# Patient Record
Sex: MALE | Race: ASIAN | NOT HISPANIC OR LATINO | URBAN - METROPOLITAN AREA
[De-identification: names, ages, dates, MRNs, and addresses within clinical notes are randomized per-mention and may not be internally consistent; named-entity substitution may affect disease eponyms.]

---

## 2023-05-10 ENCOUNTER — INPATIENT (INPATIENT)
Facility: HOSPITAL | Age: 88
LOS: 0 days | Discharge: HOME | DRG: 948 | End: 2023-05-11
Attending: HOSPITALIST | Admitting: HOSPITALIST
Payer: MEDICARE

## 2023-05-10 VITALS
WEIGHT: 145.06 LBS | OXYGEN SATURATION: 96 % | RESPIRATION RATE: 18 BRPM | HEART RATE: 75 BPM | DIASTOLIC BLOOD PRESSURE: 68 MMHG | TEMPERATURE: 98 F | SYSTOLIC BLOOD PRESSURE: 139 MMHG | HEIGHT: 67 IN

## 2023-05-10 DIAGNOSIS — R53.1 WEAKNESS: ICD-10-CM

## 2023-05-10 LAB
ALBUMIN SERPL ELPH-MCNC: 4 G/DL — SIGNIFICANT CHANGE UP (ref 3.5–5.2)
ALP SERPL-CCNC: 63 U/L — SIGNIFICANT CHANGE UP (ref 30–115)
ALT FLD-CCNC: 9 U/L — SIGNIFICANT CHANGE UP (ref 0–41)
ANION GAP SERPL CALC-SCNC: 12 MMOL/L — SIGNIFICANT CHANGE UP (ref 7–14)
AST SERPL-CCNC: 16 U/L — SIGNIFICANT CHANGE UP (ref 0–41)
BASOPHILS # BLD AUTO: 0.03 K/UL — SIGNIFICANT CHANGE UP (ref 0–0.2)
BASOPHILS NFR BLD AUTO: 0.3 % — SIGNIFICANT CHANGE UP (ref 0–1)
BILIRUB SERPL-MCNC: 0.4 MG/DL — SIGNIFICANT CHANGE UP (ref 0.2–1.2)
BUN SERPL-MCNC: 31 MG/DL — HIGH (ref 10–20)
CALCIUM SERPL-MCNC: 9.3 MG/DL — SIGNIFICANT CHANGE UP (ref 8.4–10.5)
CHLORIDE SERPL-SCNC: 104 MMOL/L — SIGNIFICANT CHANGE UP (ref 98–110)
CO2 SERPL-SCNC: 23 MMOL/L — SIGNIFICANT CHANGE UP (ref 17–32)
CREAT SERPL-MCNC: 1.7 MG/DL — HIGH (ref 0.7–1.5)
EGFR: 39 ML/MIN/1.73M2 — LOW
EOSINOPHIL # BLD AUTO: 0.03 K/UL — SIGNIFICANT CHANGE UP (ref 0–0.7)
EOSINOPHIL NFR BLD AUTO: 0.3 % — SIGNIFICANT CHANGE UP (ref 0–8)
GLUCOSE SERPL-MCNC: 114 MG/DL — HIGH (ref 70–99)
HCT VFR BLD CALC: 42.7 % — SIGNIFICANT CHANGE UP (ref 42–52)
HGB BLD-MCNC: 14.2 G/DL — SIGNIFICANT CHANGE UP (ref 14–18)
IMM GRANULOCYTES NFR BLD AUTO: 0.4 % — HIGH (ref 0.1–0.3)
LYMPHOCYTES # BLD AUTO: 1.26 K/UL — SIGNIFICANT CHANGE UP (ref 1.2–3.4)
LYMPHOCYTES # BLD AUTO: 13.1 % — LOW (ref 20.5–51.1)
MAGNESIUM SERPL-MCNC: 2.4 MG/DL — SIGNIFICANT CHANGE UP (ref 1.8–2.4)
MCHC RBC-ENTMCNC: 31.5 PG — HIGH (ref 27–31)
MCHC RBC-ENTMCNC: 33.3 G/DL — SIGNIFICANT CHANGE UP (ref 32–37)
MCV RBC AUTO: 94.7 FL — HIGH (ref 80–94)
MONOCYTES # BLD AUTO: 0.48 K/UL — SIGNIFICANT CHANGE UP (ref 0.1–0.6)
MONOCYTES NFR BLD AUTO: 5 % — SIGNIFICANT CHANGE UP (ref 1.7–9.3)
NEUTROPHILS # BLD AUTO: 7.8 K/UL — HIGH (ref 1.4–6.5)
NEUTROPHILS NFR BLD AUTO: 80.9 % — HIGH (ref 42.2–75.2)
NRBC # BLD: 0 /100 WBCS — SIGNIFICANT CHANGE UP (ref 0–0)
NT-PROBNP SERPL-SCNC: 998 PG/ML — HIGH (ref 0–300)
PLATELET # BLD AUTO: 133 K/UL — SIGNIFICANT CHANGE UP (ref 130–400)
PMV BLD: 11.7 FL — HIGH (ref 7.4–10.4)
POTASSIUM SERPL-MCNC: 4.2 MMOL/L — SIGNIFICANT CHANGE UP (ref 3.5–5)
POTASSIUM SERPL-SCNC: 4.2 MMOL/L — SIGNIFICANT CHANGE UP (ref 3.5–5)
PROT SERPL-MCNC: 6.9 G/DL — SIGNIFICANT CHANGE UP (ref 6–8)
RBC # BLD: 4.51 M/UL — LOW (ref 4.7–6.1)
RBC # FLD: 12.4 % — SIGNIFICANT CHANGE UP (ref 11.5–14.5)
SODIUM SERPL-SCNC: 139 MMOL/L — SIGNIFICANT CHANGE UP (ref 135–146)
TROPONIN T SERPL-MCNC: <0.01 NG/ML — SIGNIFICANT CHANGE UP
WBC # BLD: 9.64 K/UL — SIGNIFICANT CHANGE UP (ref 4.8–10.8)
WBC # FLD AUTO: 9.64 K/UL — SIGNIFICANT CHANGE UP (ref 4.8–10.8)

## 2023-05-10 PROCEDURE — 82164 ANGIOTENSIN I ENZYME TEST: CPT

## 2023-05-10 PROCEDURE — 86901 BLOOD TYPING SEROLOGIC RH(D): CPT

## 2023-05-10 PROCEDURE — 86850 RBC ANTIBODY SCREEN: CPT

## 2023-05-10 PROCEDURE — 76775 US EXAM ABDO BACK WALL LIM: CPT

## 2023-05-10 PROCEDURE — 70450 CT HEAD/BRAIN W/O DYE: CPT | Mod: 26,MA

## 2023-05-10 PROCEDURE — 36415 COLL VENOUS BLD VENIPUNCTURE: CPT

## 2023-05-10 PROCEDURE — 93005 ELECTROCARDIOGRAM TRACING: CPT

## 2023-05-10 PROCEDURE — 99285 EMERGENCY DEPT VISIT HI MDM: CPT

## 2023-05-10 PROCEDURE — 86900 BLOOD TYPING SEROLOGIC ABO: CPT

## 2023-05-10 PROCEDURE — 80048 BASIC METABOLIC PNL TOTAL CA: CPT

## 2023-05-10 PROCEDURE — 99223 1ST HOSP IP/OBS HIGH 75: CPT

## 2023-05-10 PROCEDURE — 85027 COMPLETE CBC AUTOMATED: CPT

## 2023-05-10 PROCEDURE — 83735 ASSAY OF MAGNESIUM: CPT

## 2023-05-10 PROCEDURE — 87086 URINE CULTURE/COLONY COUNT: CPT

## 2023-05-10 PROCEDURE — 84100 ASSAY OF PHOSPHORUS: CPT

## 2023-05-10 PROCEDURE — 86480 TB TEST CELL IMMUN MEASURE: CPT

## 2023-05-10 PROCEDURE — 80061 LIPID PANEL: CPT

## 2023-05-10 PROCEDURE — 71045 X-RAY EXAM CHEST 1 VIEW: CPT | Mod: 26

## 2023-05-10 PROCEDURE — 97162 PT EVAL MOD COMPLEX 30 MIN: CPT | Mod: GP

## 2023-05-10 PROCEDURE — 81003 URINALYSIS AUTO W/O SCOPE: CPT

## 2023-05-10 NOTE — H&P ADULT - CONVERSATION DETAILS
Used language line  ID [id number]    Explained to pt that unless otherwise stated, in a hospital everyone is treated as full code, meaning we do everything to keep them alive such as compressions when a pt's heart does not beat on its own and intubation and ventilation when a pt cannot breathe on their own. Asked whether this is something that pt would want.   Stated "of course!"    Asked whether pt has anyone who they would want to make their healthcare decisions for them if they were too confused, not awake, or otherwise unable to do so.  HCP/Daughter Ivy [phone], [phone]

## 2023-05-10 NOTE — ED PROVIDER NOTE - CONSIDERATION OF ADMISSION OBSERVATION
Consideration of Admission/Observation Patient requires inpatient hospitalization - monitored setting.

## 2023-05-10 NOTE — ED PROVIDER NOTE - WR INTERPRETATION 1
No focal consolidation, possible ectatic versus dilated aorta, clinically does not correlate with any signs of dissection

## 2023-05-10 NOTE — ED ADULT NURSE NOTE - OBJECTIVE STATEMENT
Pt presents to the ED c/o a syncopal episode today. Pt is Cantonese speaking. Daughter states that patient was found to be weak and hypotensive. Pt is not currently hypotensive at bedside.

## 2023-05-10 NOTE — H&P ADULT - ASSESSMENT
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   Of note- pt is unreliable vs forgetful historian. I asked him in his native language twice whether he had a cardiologist and whether he had medications with him and he said no, same when using a Cantonese , but much later revealed he is seeing a cardiologist and took medications out of his pocket which included Losartan 50 and Eliquis 2.5mg.   Pt states he has some subacute-chronic weakness and maybe some SOB which is why he no longer leaves the house much as he cannot walk for too long. However, does not use any cane or walker. Has been feeling baseline until around 11am while preparing vegetables and started feeling "off" and lightheaded, so sat down and had not walked since due to fear he may fall. Denies any associated symptoms such as CP, SOB, n/v, abd pain, focal weakness numbness or tingling, speech or vision changes. Some time after arrival at hospital the dizziness self-resolved, lasting for a total of ~2 hours. States he had one episode in the past like this years ago for which he went to a hospital but cannot recall what happened there.   When asked, states he does not have the phone numbers of any of his providers (they have very common last names eg Dr Heredia), cannot remember the address of his pharmacy, does not have med list with him. Initially stated he cannot remember phone number of his daughter and had to specifically instruct him to get number from cell phone.    HCP/Daughter Ivy [phone], [phone]    #Dizziness  #Afib/flutter - unclear onset   On 5/10/23 had 2 hour episode of dizziness w no other symptoms and which self resolved.   CT Head done 5/10/23 in ED unremarkable. Neuro evaluated pt w rec for potential OP EEG.   - EKG done in ED w AF/flutter. At bedside monitor ED3 room 5 - seemed to be sinus, but after moving around and using urinal went into aflutter 110-120s.   - Cont Eliquis 2.5mg BID which pt revealed he was taking when I went to explain that he has an irregular heartbeat, but states he has not had irregular heartbeat before.   - Start metoprolol tartrate 25mg BID as pt intermittently tachycardic on minimal exertion   - EP consulted for new AF but please confer w pt's daughter/family re PMHx and Cardiologist contact info, pt may have had h/o AFib/flutter without realizing   - Echo to evaluate for HF, valve disease given murmur on exam   - If pt is paroxysmal AF should consider rhythm control   - PT consult    #Suspected CAD   #Chronic generalized weakness  Describes having full diet but seems to have limited exercise tolerance by description. Suspect underlying cardiac disease - pt initially denied having a cardiologist and then stated he does have one. CXR also w what appears to be calcified aorta.   Less likely nutritional deficiency as pt of reasonable build and no dietary restrictions, no anemia noted on labs though does have decreased MCV. Could be hypothyroidism but no constipation or weight gain.   - F/u Folate, B12, TSH   - Echo      #CKD   Unclear baseline Cr. Please clarify w family if possible.   Trend.    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 However, when informed of questionable lung mass noted on imaging, states that has been there for a long time and gets CT Chest w his Pulmonologist yearly, most recently a few months ago. Denies any medications for lungs.     #??R mastoid skin graft??  Unclear what surgery this pt is referring to. States tissue was taken from his leg and placed behind his R ear. Area looks depressed, but area well healed and w/o erythema, is clean, dry, intact, no evidence of infection. Potentially had h/o injury/infection/??OM and required skin graft?     #HTN - Losartan 50mg QD   #HLD - States he takes a medication for cholesterol - please clarify, but will start Atorvastatin 40mg qhs for now.   #hemorrhoidectomy - f/u as OP                                                                               ----------------------------------------------------  # DVT prophylaxis: on Eliquis 2.5mg BID as outpatient, unclear why but continue here for AF  # GI prophylaxis: N/A  # Diet: Regular, DASH TLC   # Activity: Ambulate with assistance   # Code status: FULL CODE   # Disposition: From home                                                                           --------------------------------------------------------    # Handoff:   - Med rec/PMH clarification w daughter   - Echo  - EP consult  - PT consult   - f/u TSH, B9, B12   Cantonese speaking 87 y/o man w PMHx of HTN, HLD, ?CAD, former tob use, 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   Of note- pt is unreliable vs forgetful historian. I asked him in his native language twice whether he had a cardiologist and whether he had medications with him and he said no, same when using a Cantonese , but much later revealed he is seeing a cardiologist and took medications out of his pocket which included Losartan 50 and Eliquis 2.5mg.   Pt states he has some subacute-chronic weakness and maybe some SOB which is why he no longer leaves the house much as he cannot walk for too long. However, does not use any cane or walker. Has been feeling baseline until around 11am while preparing vegetables and started feeling "off" and lightheaded, so sat down and had not walked since due to fear he may fall. Denies any associated symptoms such as CP, SOB, n/v, abd pain, focal weakness numbness or tingling, speech or vision changes. Some time after arrival at hospital the dizziness self-resolved, lasting for a total of ~2 hours. States he had one episode in the past like this years ago for which he went to a hospital but cannot recall what happened there.   When asked, states he does not have the phone numbers of any of his providers (they have very common last names eg Dr Heredia), cannot remember the address of his pharmacy, does not have med list with him. Initially stated he cannot remember phone number of his daughter and had to specifically instruct him to get number from cell phone.    HCP/Daughter Ivy [phone], [phone]    #Dizziness  #Afib/flutter - unclear onset   On 5/10/23 had 2 hour episode of dizziness w no other symptoms and which self resolved.   CT Head done 5/10/23 in ED unremarkable. Neuro evaluated pt w rec for potential OP EEG.   - EKG done in ED w AF/flutter. At bedside monitor ED3 room 5 - seemed to be sinus, but after moving around and using urinal went into aflutter 110-120s.   - Cont Eliquis 2.5mg BID which pt revealed he was taking when I went to explain that he has an irregular heartbeat, but states he has not had irregular heartbeat before.   - Start metoprolol tartrate 25mg BID as pt intermittently tachycardic on minimal exertion   - EP consulted for new AF but please confer w pt's daughter/family re PMHx and Cardiologist contact info, pt may have had h/o AFib/flutter without realizing   - Echo to evaluate for HF, valve disease given murmur on exam   - If pt is paroxysmal AF should consider rhythm control   - Orthostatics  - PT consult    #Suspected CAD   #Chronic generalized weakness  Describes having full diet but seems to have limited exercise tolerance by description. Suspect underlying cardiac disease - pt initially denied having a cardiologist and then stated he does have one. CXR also w what appears to be calcified aorta.   Less likely nutritional deficiency as pt of reasonable build and no dietary restrictions, no anemia noted on labs though does have decreased MCV. Could be hypothyroidism but no constipation or weight gain.   - F/u Folate, B12, TSH   - Echo      #CKD   Unclear baseline Cr. Please clarify w family if possible.   Trend.    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 However, when informed of questionable lung mass noted on imaging, states that has been there for a long time and gets CT Chest w his Pulmonologist yearly, most recently a few months ago. Denies any medications for lungs.     #??R mastoid skin graft??  Unclear what surgery this pt is referring to. States tissue was taken from his leg and placed behind his R ear. Area looks depressed, but area well healed and w/o erythema, is clean, dry, intact, no evidence of infection. Potentially had h/o injury/infection/??OM and required skin graft?     #HTN - Losartan 50mg QD   #HLD - States he takes a medication for cholesterol - please clarify, but will start Atorvastatin 40mg qhs for now.   #hemorrhoidectomy - f/u as OP                                                                               ----------------------------------------------------  # DVT prophylaxis: on Eliquis 2.5mg BID as outpatient, unclear why but continue here for AF  # GI prophylaxis: N/A  # Diet: Regular, DASH TLC   # Activity: Ambulate with assistance   # Code status: FULL CODE   # Disposition: From home                                                                           --------------------------------------------------------    # Handoff:   - Med rec/PMH clarification w daughter   - Echo  - EP consult  - PT consult   - f/u TSH, B9, B12

## 2023-05-10 NOTE — H&P ADULT - ATTENDING COMMENTS
Patient seen and examined at bedside independently of the residents. I read the resident's note and agree with the plan with the additions and corrections as noted below. My note supersedes the resident's note.     REVIEW OF SYSTEMS:  CONSTITUTIONAL: No weakness, fevers or chills  EYES/ENT: No visual changes;  No vertigo or throat pain   NECK: No pain or stiffness  RESPIRATORY: No cough, wheezing, hemoptysis; No shortness of breath  CARDIOVASCULAR: No chest pain or palpitations  GASTROINTESTINAL: No abdominal or epigastric pain. No nausea, vomiting, or hematemesis; No diarrhea or constipation. No melena or hematochezia.  GENITOURINARY: No dysuria, frequency or hematuria  NEUROLOGICAL: No numbness or weakness  MSK: No pain. No weakness.   SKIN: No itching, rashes.     PMH:     FHx: Reviewed. No fhx of asthma/copd, No fhx of liver and pulmonary disease. No fhx of hematological disorder.     Physical Exam:  GEN: No acute distress. Awake, Alert and oriented x 3.   Head: Atraumatic, Normocephalic.   Eye: PEERLA. No sclera icterus. EOMI.   ENT: Normal oropharynx, no thyromegaly, no mass, no lymphadenopathy.   LUNGS: Clear to auscultation bilaterally. No wheeze/rales/crackles.   HEART: Normal. S1/S2 present. RRR. No murmur/gallops.   ABD: Soft, non-tender, non-distended. Bowel sounds present.   EXT: No pitting edema. No erythema. No tenderness.  Integumentary: No rash, No sore, No petechia.   NEURO: CN III-XII intact. Strength: 5/5 b/l ULE. Sensory intact b/l ULE.     Vital Signs Last 24 Hrs  T(C): 36.5 (10 May 2023 15:25), Max: 36.5 (10 May 2023 15:25)  T(F): 97.7 (10 May 2023 15:25), Max: 97.7 (10 May 2023 15:25)  HR: 96 (10 May 2023 22:50) (75 - 96)  BP: 130/58 (10 May 2023 22:50) (130/58 - 139/68)  BP(mean): --  RR: 16 (10 May 2023 22:50) (16 - 18)  SpO2: 98% (10 May 2023 22:50) (96% - 98%)    Parameters below as of 10 May 2023 22:50  Patient On (Oxygen Delivery Method): room air      Please see the above notes for Labs and radiology.     Assessment and Plan:         DVT ppx:   GI ppx:    Diet:   Activity: as tolerated.     Date seen by the attending:   Total time spent: 75 minutes. Patient seen and examined at bedside independently of the residents. I read the resident's note and agree with the plan with the additions and corrections as noted below. My note supersedes the resident's note.     REVIEW OF SYSTEMS:  Negative except in HPI.     PMH:  HTN, HLD and CKD    FHx: Reviewed. No fhx of asthma/copd, No fhx of liver and pulmonary disease. No fhx of hematological disorder.     Physical Exam:  GEN: No acute distress. Awake, Alert and oriented x 3.   Head: Atraumatic, Normocephalic.   Eye: PEERLA. No sclera icterus. EOMI.   ENT: Normal oropharynx, no thyromegaly, no mass, no lymphadenopathy.   LUNGS: Clear to auscultation bilaterally. No wheeze/rales/crackles.   HEART: Normal. S1/S2 present. Irregularly irregular. No murmur/gallops.   ABD: Soft, non-tender, non-distended. Bowel sounds present.   EXT: No pitting edema. No erythema. No tenderness.  Integumentary: No rash, No sore, No petechia.   NEURO: CN III-XII intact. Strength: 5/5 b/l ULE. Sensory intact b/l ULE.     Vital Signs Last 24 Hrs  T(C): 36.5 (10 May 2023 15:25), Max: 36.5 (10 May 2023 15:25)  T(F): 97.7 (10 May 2023 15:25), Max: 97.7 (10 May 2023 15:25)  HR: 96 (10 May 2023 22:50) (75 - 96)  BP: 130/58 (10 May 2023 22:50) (130/58 - 139/68)  BP(mean): --  RR: 16 (10 May 2023 22:50) (16 - 18)  SpO2: 98% (10 May 2023 22:50) (96% - 98%)    Parameters below as of 10 May 2023 22:50  Patient On (Oxygen Delivery Method): room air      Please see the above notes for Labs and radiology.     Assessment and Plan:     89 yo M with hx of  HTN, HLD and CKD  presents to ED for pre-syncopal episode.     Pre-syncope   - Ddx: cardiac etiology vs. orthostatic vs. less likely 2/2 neuro  - CTH neg for acute intracranial pathology.   - Troponin neg x 1.   - check TTE and carotid duplex   - check orthostatic VS  - check UA and UCx   - monitor on Telemetry.     New onset A.fib ?  - EKG shows A.fib.   -  PO lopressor 25mg BID.   - check TTE and TSH   - check UA and UCx  - CHADS VASC 3. Will start on eliquis.   - monitor on Telemetry.   - EP consult.     ALESSANDRA on CKD vs. CKD   - serum Cr 1.7. No baseline Cr on chart.   - c/w IVF NS @ 75 cc/hr x 12 hours.   - renal US and urine studies.   - monitor BMP.     HTN - hold losartan for now due to possible ALESSANDRA.     DVT ppx: Heparin SC  GI ppx: PPI  Diet: NPO after MN  Diet: Activity: as tolerated.     DVT ppx: eliquis  GI ppx:  not indicated.   Diet: NPO for now.   Activity: as tolerated.     Date seen by the attendin/10/2023  Total time spent: 75 minutes. Patient seen and examined at bedside independently of the residents. I read the resident's note and agree with the plan with the additions and corrections as noted below. My note supersedes the resident's note.     REVIEW OF SYSTEMS:  Negative except in HPI.     PMH:  HTN, HLD and CKD    FHx: Reviewed. No fhx of asthma/copd, No fhx of liver and pulmonary disease. No fhx of hematological disorder.     Physical Exam:  GEN: No acute distress. Awake, Alert and oriented x 3.   Head: Atraumatic, Normocephalic.   Eye: PEERLA. No sclera icterus. EOMI.   ENT: Normal oropharynx, no thyromegaly, no mass, no lymphadenopathy.   LUNGS: Clear to auscultation bilaterally. No wheeze/rales/crackles.   HEART: Normal. S1/S2 present. Irregularly irregular. No murmur/gallops.   ABD: Soft, non-tender, non-distended. Bowel sounds present.   EXT: No pitting edema. No erythema. No tenderness.  Integumentary: No rash, No sore, No petechia.   NEURO: CN III-XII intact. Strength: 5/5 b/l ULE. Sensory intact b/l ULE.     Vital Signs Last 24 Hrs  T(C): 36.5 (10 May 2023 15:25), Max: 36.5 (10 May 2023 15:25)  T(F): 97.7 (10 May 2023 15:25), Max: 97.7 (10 May 2023 15:25)  HR: 96 (10 May 2023 22:50) (75 - 96)  BP: 130/58 (10 May 2023 22:50) (130/58 - 139/68)  BP(mean): --  RR: 16 (10 May 2023 22:50) (16 - 18)  SpO2: 98% (10 May 2023 22:50) (96% - 98%)    Parameters below as of 10 May 2023 22:50  Patient On (Oxygen Delivery Method): room air      Please see the above notes for Labs and radiology.     Assessment and Plan:     87 yo M with hx of  HTN, HLD and CKD  presents to ED for pre-syncopal episode.     Pre-syncope   - Ddx: cardiac etiology vs. orthostatic vs. less likely 2/2 neuro  - CTH neg for acute intracranial pathology.   - Troponin neg x 1.   - check TTE and carotid duplex   - check orthostatic VS  - check UA and UCx   - monitor on Telemetry.     New onset A.fib ?  - EKG shows A.fib.   -  PO lopressor 25mg BID.   - check TTE and TSH   - check UA and UCx  - CHADS VASC 3. Will start on eliquis.   - monitor on Telemetry.   - EP consult.     ALESSANDRA on CKD vs. CKD   - serum Cr 1.7. No baseline Cr on chart.   - c/w IVF NS @ 75 cc/hr x 12 hours.   - renal US and urine studies.   - monitor BMP.     HTN - hold losartan for now due to possible ALESSANDRA.     DVT ppx: eliquis  GI ppx:  not indicated.   Diet: NPO for now.   Activity: as tolerated.     Date seen by the attendin/10/2023  Total time spent: 75 minutes. Patient seen and examined at bedside independently of the residents. I read the resident's note and agree with the plan with the additions and corrections as noted below. My note supersedes the resident's note.     REVIEW OF SYSTEMS:  Negative except in HPI.     PMH:  HTN, HLD and CKD    FHx: Reviewed. No fhx of asthma/copd, No fhx of liver and pulmonary disease. No fhx of hematological disorder.     Physical Exam:  GEN: No acute distress. Awake, Alert and oriented x 3.   Head: Atraumatic, Normocephalic.   Eye: PEERLA. No sclera icterus. EOMI.   ENT: Normal oropharynx, no thyromegaly, no mass, no lymphadenopathy.   LUNGS: Clear to auscultation bilaterally. No wheeze/rales/crackles.   HEART: Normal. S1/S2 present. Irregularly irregular. No murmur/gallops.   ABD: Soft, non-tender, non-distended. Bowel sounds present.   EXT: No pitting edema. No erythema. No tenderness.  Integumentary: No rash, No sore, No petechia.   NEURO: CN III-XII intact. Strength: 5/5 b/l ULE. Sensory intact b/l ULE.     Vital Signs Last 24 Hrs  T(C): 36.5 (10 May 2023 15:25), Max: 36.5 (10 May 2023 15:25)  T(F): 97.7 (10 May 2023 15:25), Max: 97.7 (10 May 2023 15:25)  HR: 96 (10 May 2023 22:50) (75 - 96)  BP: 130/58 (10 May 2023 22:50) (130/58 - 139/68)  BP(mean): --  RR: 16 (10 May 2023 22:50) (16 - 18)  SpO2: 98% (10 May 2023 22:50) (96% - 98%)    Parameters below as of 10 May 2023 22:50  Patient On (Oxygen Delivery Method): room air      Please see the above notes for Labs and radiology.     Assessment and Plan:     89 yo M with hx of  HTN, HLD and CKD  presents to ED for pre-syncopal episode.     Pre-syncope   - Ddx: cardiac etiology vs. orthostatic vs. less likely 2/2 neuro  - CTH neg for acute intracranial pathology.   - Troponin neg x 1.   - check TTE and carotid duplex   - check orthostatic VS  - check UA and UCx   - monitor on Telemetry.     Atrial fibrillation - new onset?   - EKG shows A.fib.   -  PO lopressor 25mg BID.   - check TTE and TSH   - check UA and UCx  - CHADS VASC 3. Will start on eliquis.   - monitor on Telemetry.   - EP consult.     ALESSANDRA on CKD vs. CKD   - serum Cr 1.7. No baseline Cr on chart.   - c/w IVF NS @ 75 cc/hr x 12 hours.   - renal US and urine studies.   - monitor BMP.     HTN - hold losartan for now due to possible ALESSANDRA.     DVT ppx: eliquis  GI ppx:  not indicated.   Diet: NPO for now.   Activity: as tolerated.     Date seen by the attendin/10/2023  Total time spent: 75 minutes.

## 2023-05-10 NOTE — ED PROVIDER NOTE - OBJECTIVE STATEMENT
pt with pmhx HTN, HLD, CKD presents to ED with daughter (on AC, but unsure why) for eval after feeling weak over the last week and today had a ?syncopal vs pre-syncopal episode. pt was sitting in a recliner when his head dropped and he stopped responding but his eyes were open and his hands were shaking per pts daughter. Denies fever/chill/HA/dizziness/chest pain/palpitation/sob/abd pain/n/v/d/ black stool/bloody stool/urinary sxs

## 2023-05-10 NOTE — H&P ADULT - NSHPREVIEWOFSYSTEMS_GEN_ALL_CORE
General: No fevers, chills, generalized weakness  HEENT: No vision changes, speech changes, cough, sore throat  CV: No CP, chest pressure, palpitations   Resp: No SOB, wheezing  GI: No nausea, vomiting, abd pain  Ext: No arm or leg swelling or pain  Neuro: No acute confusion, headache, focal weakness. (+)dizziness  Skin: No rashes or lesions

## 2023-05-10 NOTE — H&P ADULT - NSHPLABSRESULTS_GEN_ALL_CORE
14.2   9.64  )-----------( 133      ( 10 May 2023 16:48 )             42.7     05-10    139  |  104  |  31<H>  ----------------------------<  114<H>  4.2   |  23  |  1.7<H>    Ca    9.3      10 May 2023 16:48  Mg     2.4     05-10    TPro  6.9  /  Alb  4.0  /  TBili  0.4  /  DBili  x   /  AST  16  /  ALT  9   /  AlkPhos  63  05-10

## 2023-05-10 NOTE — CONSULT NOTE ADULT - SUBJECTIVE AND OBJECTIVE BOX
Neurology Consult    Patient is a 88M with a PMH of HTN, HLD, and CKD who presents to the ED for near-syncopal episode. Per patient's family, patient was standing and walking around the house when he suddenly expressed that he was not feeling well and sat down. While sitting down patient continue to feel strange. Daughter notes that his "face went pale" and patient endorses sounds being muffled. He started to lean over to one side and family supported him, keeping him upright. Per family, the episode lasted a couple of minutes, prompting call to EMS. Patient is back to baseline at the time of ED evaluation. Family denies any seizure hx, convulsive activity, urinary incontinence, or tongue bite.      PAST MEDICAL & SURGICAL HISTORY:      FAMILY HISTORY:      Social History: (-) x 3    Allergies    No Known Allergies    Intolerances        MEDICATIONS  (STANDING):    MEDICATIONS  (PRN):      Review of systems:    Constitutional: as per HPI  Eyes: No eye pain or discharge  ENMT:  No difficulty hearing; No sinus or throat pain  Neck: No pain or stiffness  Respiratory: No cough, wheezing, chills or hemoptysis  Cardiovascular: No chest pain, palpitations, shortness of breath, dyspnea on exertion  Gastrointestinal: No abdominal pain, nausea, vomiting or hematemesis; No diarrhea or constipation.   Genitourinary: No dysuria, frequency, hematuria or incontinence  Neurological: As per HPI  Skin: No rashes or lesions   Endocrine: No heat or cold intolerance; No hair loss  Musculoskeletal: No joint pain or swelling  Psychiatric: No depression, anxiety, mood swings  Heme/Lymph: No easy bruising or bleeding gums    Vital Signs Last 24 Hrs  T(C): 36.5 (10 May 2023 15:25), Max: 36.5 (10 May 2023 15:25)  T(F): 97.7 (10 May 2023 15:25), Max: 97.7 (10 May 2023 15:25)  HR: 75 (10 May 2023 15:25) (75 - 75)  BP: 139/68 (10 May 2023 15:25) (139/68 - 139/68)  BP(mean): --  RR: 18 (10 May 2023 15:25) (18 - 18)  SpO2: 96% (10 May 2023 15:25) (96% - 96%)    Parameters below as of 10 May 2023 15:25  Patient On (Oxygen Delivery Method): room air          Neurological Examination:  General:  Appearance is consistent with chronologic age.  No abnormal facies.  Gross skin survey within normal limits.    Cognitive/Language:  Awake, alert, and oriented to person, place, time and date.  Recent and remote memory intact.  Fund of knowledge is appropriate.  Naming, repetition and comprehension intact.   Nondysarthric.    Cranial Nerves  - Eyes: Visual fields full.  EOMI w/o nystagmus, skew or reported double vision. No ptosis/weakness of eyelid closure.    - Face:  Facial sensation normal V1 - 3, no facial asymmetry.    - Ears/Nose/Throat:  Hearing grossly intact  Motor examination:  Upper Extremities: L 5/5, R 5/5; Lower extremities: L 5/5, R 5/5.  No observable drift. Normal tone and bulk. No tenderness, twitching, tremors or involuntary movements.  Sensory examination:   Intact to light touch throughout  Cerebellum:   FTN/HKS intact.  No dysmetria or dysdiadokinesia.  Gait deferred      Labs:   CBC Full  -  ( 10 May 2023 16:48 )  WBC Count : 9.64 K/uL  RBC Count : 4.51 M/uL  Hemoglobin : 14.2 g/dL  Hematocrit : 42.7 %  Platelet Count - Automated : 133 K/uL  Mean Cell Volume : 94.7 fL  Mean Cell Hemoglobin : 31.5 pg  Mean Cell Hemoglobin Concentration : 33.3 g/dL  Auto Neutrophil # : 7.80 K/uL  Auto Lymphocyte # : 1.26 K/uL  Auto Monocyte # : 0.48 K/uL  Auto Eosinophil # : 0.03 K/uL  Auto Basophil # : 0.03 K/uL  Auto Neutrophil % : 80.9 %  Auto Lymphocyte % : 13.1 %  Auto Monocyte % : 5.0 %  Auto Eosinophil % : 0.3 %  Auto Basophil % : 0.3 %    05-10    139  |  104  |  31<H>  ----------------------------<  114<H>  4.2   |  23  |  1.7<H>    Ca    9.3      10 May 2023 16:48  Mg     2.4     05-10    TPro  6.9  /  Alb  4.0  /  TBili  0.4  /  DBili  x   /  AST  16  /  ALT  9   /  AlkPhos  63  05-10    LIVER FUNCTIONS - ( 10 May 2023 16:48 )  Alb: 4.0 g/dL / Pro: 6.9 g/dL / ALK PHOS: 63 U/L / ALT: 9 U/L / AST: 16 U/L / GGT: x                   Neuroimaging:  NCHCT: CT Head No Cont:   ACC: [id number] EXAM:  CT BRAIN   ORDERED BY: LISA CANALES     PROCEDURE DATE:  05/10/2023          INTERPRETATION:  CLINICAL INDICATION: Dizziness, lightheadedness.    TECHNIQUE: CT of the head was performed without the administration of   intravenous contrast.    COMPARISON: None available.    FINDINGS:    There is prominence of the sulci, sylvian fissures, and ventricles likely   reflecting mild diffuse parenchymal volume loss.    There are scattered patchy low attenuations in bilateral periventricular   and subcortical cerebral white matter consistent with moderate chronic   microvascular ischemic changes.    There is no evidence of acute territorial infarct or intracranial   hemorrhage. There is no space-occupying lesion or midline shift.    There is no evidence of hydrocephalus. There are no extra-axial fluid   collections.    The visualized intraorbital contents are normal. Small mucosal polyp in   the left sphenoid sinus. The mastoid air cells are aerated. The   visualized soft tissues and osseous structures appear normal.      IMPRESSION:    No acute intracranial pathology.    Moderate chronic microvascular ischemic changes.    --- End of Report ---            SAM MUSTAFA MD; Attending Radiologist  This document has been electronically signed. May 10 2023  5:25PM (05-10-23 @ 17:12)      05-10-23 @ 18:09

## 2023-05-10 NOTE — ED PROVIDER NOTE - NS ED ROS FT
Constitutional: no fever, chills, no recent weight loss, change in appetite or malaise  Eyes: no redness/discharge/pain/vision changes  ENT: no rhinorrhea/ear pain/sore throat  Cardiac: No chest pain, SOB or edema.  Respiratory: No cough or respiratory distress  GI: No nausea, vomiting, diarrhea or abdominal pain.  : No dysuria, frequency, urgency or hematuria  MS: no pain to back or extremities, no loss of ROM  Neuro: No headache  Skin: No skin rash.  Except as documented in the HPI, all other systems are negative.

## 2023-05-10 NOTE — H&P ADULT - HISTORY OF PRESENT ILLNESS
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   Of note- pt is unreliable vs forgetful historian. I asked him in his native language twice whether he had a cardiologist and whether he had medications with him and he said no, same when using a Cantonese , but much later revealed he is seeing a cardiologist and took medications out of his pocket which included Losartan 50 and Eliquis 2.5mg.   Pt states he has some subacute-chronic weakness and maybe some SOB which is why he no longer leaves the house much as he cannot walk for too long. However, does not use any cane or walker. Has been feeling baseline until around 11am while preparing vegetables and started feeling "off" and lightheaded, so sat down and had not walked since due to fear he may fall. Denies any associated symptoms such as CP, SOB, n/v, abd pain, focal weakness numbness or tingling, speech or vision changes. Some time after arrival at hospital the dizziness self-resolved, lasting for a total of ~2 hours. States he had one episode in the past like this years ago for which he went to a hospital but cannot recall what happened there.   When asked, states he does not have the phone numbers of any of his providers (they have very common last names eg Dr Heredia), cannot remember the address of his pharmacy, does not have med list with him. Initially stated he cannot remember phone number of his daughter and had to specifically instruct him to get number from cell phone.    HCP/Daughter Ivy [phone], [phone]

## 2023-05-10 NOTE — ED PROVIDER NOTE - NS ED ATTENDING STATEMENT MOD
This was a shared visit with the LEXY. I reviewed and verified the documentation and independently performed the documented:

## 2023-05-10 NOTE — ED ADULT NURSE NOTE - CAS DISCH TRANSFER METHOD
ICD-10-CM ICD-9-CM    1. Primary hypertension  I10 401.9 amLODIPine (NORVASC) 10 mg tablet     Continue aldactone and lisinopril. See Videojughart message.
Walking

## 2023-05-10 NOTE — H&P ADULT - NSHPPHYSICALEXAM_GEN_ALL_CORE
PHYSICAL EXAM:  GENERAL: NAD, lying in bed comfortably, on room air.   HEAD:  Atraumatic, Normocephalic. R mastoid area depressed w well healed post-surgical changes.   EYES: EOMI, sclera clear  ENT: Moist mucous membranes  NECK: Supple, trachea midline  CHEST/LUNG: Unlabored respirations. Coarse crackles b/l bases.   HEART: Initially seemed mostly regular, and not tachycardic. 2/6 systolic murmur best heard LUSB.   ABDOMEN: (+)BS; Soft, nontender, nondistended  EXTREMITIES:  2+ Radial Pulses, brisk capillary refill. No clubbing, cyanosis. No edema.   NERVOUS SYSTEM:  A&Ox3, no noted facial droop. Moving all extremities w/o difficulty, strength 5/5 grossly all extremities.  SKIN: No rashes or lesions to b/l forearm, face.

## 2023-05-10 NOTE — ED PROVIDER NOTE - CLINICAL SUMMARY MEDICAL DECISION MAKING FREE TEXT BOX
88-year-old male with a past medical history of hypertension, hyperlipidemia, CKD presents with near syncopal episode while at home.  Has been having this intermittently but worse today.  Was worse when he was standing up and active.  No chest pain or shortness of breath.  Denies any back pain, headache, focal weakness, numbness or tingling.  On exam nontoxic, vital signs noted, heart irregular, no murmurs, lungs clear bilaterally, abdomen benign, no peripheral edema, no focal neurological deficits.  EKG irregularly irregular, rate controlled, atrial fibrillation, no ST depression elevation, normal intervals otherwise, no old to compare.  Lab work notable for CKD, as well as mildly elevated BNP.  CT head with no acute process.  Given concern for cardiac syncope, versus arrhythmia, less likely ACS, and doubt neurological primary issue, will admit to medicine for further monitoring and treatment.

## 2023-05-10 NOTE — CONSULT NOTE ADULT - ATTENDING COMMENTS
Seen and examined the patient in the ED. History is suggestive of cardiac syncopal event. Could obtain REEG in outpatient setting. Noted to have new onset AFIB and started on AC. Outpatient follow up with cardiologist and neurology

## 2023-05-10 NOTE — ED PROVIDER NOTE - NSICDXPASTMEDICALHX_GEN_ALL_CORE_FT
PAST MEDICAL HISTORY:  Afib     Benign essential HTN     Chronic kidney disease, unspecified CKD stage     HLD (hyperlipidemia)

## 2023-05-10 NOTE — ED PROVIDER NOTE - PHYSICAL EXAMINATION
CONSTITUTIONAL: Well-appearing; well-nourished; in no apparent distress.   EYES: PERRL; EOM intact.   NECK: Supple; non-tender; no cervical lymphadenopathy.   CARDIOVASCULAR: Normal S1, S2; no murmurs, rubs, or gallops.   RESPIRATORY: Normal chest excursion with respiration; breath sounds clear and equal bilaterally; no wheezes, rhonchi, or rales.  GI/: Non-distended; non-tender; no palpable organomegaly.   MS: No evidence of trauma or deformity. Normal ROM in all four extremities; non-tender to palpation; distal pulses are normal.   SKIN: Normal for age and race; warm; dry; good turgor; no apparent lesions or exudate.   NEURO/PSYCH: A & O x 4; grossly unremarkable. mood and manner are appropriate.

## 2023-05-10 NOTE — ED ADULT NURSE NOTE - NSFALLHARMRISKINTERV_ED_ALL_ED

## 2023-05-10 NOTE — CONSULT NOTE ADULT - ASSESSMENT
Complete 88M with a PMH of HTN, HLD, and CKD who presents to the ED for near-syncopal episode. Per patient's family, patient was standing and walking around the house when he suddenly expressed that he was not feeling well and sat down. While sitting down patient continue to feel strange. Daughter notes that his "face went pale" and patient endorses sounds being muffled. He started to lean over to one side and family supported him, keeping him upright. Daughter went to check patient BP and noted that "it was low". Per family, the episode lasted a couple of minutes, prompting call to EMS. Patient is back to baseline at the time of ED evaluation. Family denies any seizure hx, convulsive activity, urinary incontinence, or tongue bite.    Impression  - Symptoms sound consistent with cardiac etiology of near-syncopal episode. Labs notable for BNP of 998. Neuro exam is nonfocal. CTH is negative for acute abnormality. Differential includes orthostatic hypotension. Less likely seizure but must rule out.    Recommendations  - Consider medicine/cardiology eval for syncope  - Can obtain REEG to rule out seizure  - Obtain urinalysis to rule out UTI 88M with a PMH of HTN, HLD, and CKD who presents to the ED for near-syncopal episode. Per patient's family, patient was standing and walking around the house when he suddenly expressed that he was not feeling well and sat down. While sitting down patient continue to feel strange. Daughter notes that his "face went pale" and patient endorses sounds being muffled. He started to lean over to one side and family supported him, keeping him upright. Daughter went to check patient BP and noted that "it was low". Per family, the episode lasted a couple of minutes, prompting call to EMS. Patient is back to baseline at the time of ED evaluation. Family denies any seizure hx, convulsive activity, urinary incontinence, or tongue bite.    Impression  - Symptoms sound consistent with cardiac etiology of near-syncopal episode. Labs notable for BNP of 998. Neuro exam is nonfocal. CTH is negative for acute abnormality. Differential includes orthostatic hypotension. Less likely seizure but must rule out.    Recommendations  - Consider medicine/cardiology eval for syncope  - Can obtain REEG outpatient to rule out seizure  - Obtain urinalysis to rule out UTI

## 2023-05-10 NOTE — ED PROVIDER NOTE - EKG ADDITIONAL INFORMATION FREE TEXT
EKG irregularly irregular, rate controlled, atrial fibrillation, no ST depression elevation, normal intervals otherwise, no old to compare

## 2023-05-11 VITALS
HEART RATE: 66 BPM | RESPIRATION RATE: 18 BRPM | OXYGEN SATURATION: 95 % | DIASTOLIC BLOOD PRESSURE: 92 MMHG | SYSTOLIC BLOOD PRESSURE: 164 MMHG | TEMPERATURE: 98 F

## 2023-05-11 DIAGNOSIS — Z98.890 OTHER SPECIFIED POSTPROCEDURAL STATES: Chronic | ICD-10-CM

## 2023-05-11 DIAGNOSIS — I25.10 ATHEROSCLEROTIC HEART DISEASE OF NATIVE CORONARY ARTERY WITHOUT ANGINA PECTORIS: ICD-10-CM

## 2023-05-11 DIAGNOSIS — I48.20 CHRONIC ATRIAL FIBRILLATION, UNSPECIFIED: ICD-10-CM

## 2023-05-11 DIAGNOSIS — R91.8 OTHER NONSPECIFIC ABNORMAL FINDING OF LUNG FIELD: ICD-10-CM

## 2023-05-11 DIAGNOSIS — I10 ESSENTIAL (PRIMARY) HYPERTENSION: ICD-10-CM

## 2023-05-11 DIAGNOSIS — R42 DIZZINESS AND GIDDINESS: ICD-10-CM

## 2023-05-11 DIAGNOSIS — Z79.899 OTHER LONG TERM (CURRENT) DRUG THERAPY: ICD-10-CM

## 2023-05-11 DIAGNOSIS — E87.0 HYPEROSMOLALITY AND HYPERNATREMIA: ICD-10-CM

## 2023-05-11 LAB
ANION GAP SERPL CALC-SCNC: 15 MMOL/L — HIGH (ref 7–14)
APPEARANCE UR: CLEAR — SIGNIFICANT CHANGE UP
BILIRUB UR-MCNC: NEGATIVE — SIGNIFICANT CHANGE UP
BLD GP AB SCN SERPL QL: SIGNIFICANT CHANGE UP
BUN SERPL-MCNC: 26 MG/DL — HIGH (ref 10–20)
CALCIUM SERPL-MCNC: 9.1 MG/DL — SIGNIFICANT CHANGE UP (ref 8.4–10.4)
CHLORIDE SERPL-SCNC: 111 MMOL/L — HIGH (ref 98–110)
CHOLEST SERPL-MCNC: 155 MG/DL — SIGNIFICANT CHANGE UP
CO2 SERPL-SCNC: 22 MMOL/L — SIGNIFICANT CHANGE UP (ref 17–32)
COLOR SPEC: SIGNIFICANT CHANGE UP
CREAT SERPL-MCNC: 1.5 MG/DL — SIGNIFICANT CHANGE UP (ref 0.7–1.5)
DIFF PNL FLD: NEGATIVE — SIGNIFICANT CHANGE UP
EGFR: 45 ML/MIN/1.73M2 — LOW
GLUCOSE SERPL-MCNC: 110 MG/DL — HIGH (ref 70–99)
GLUCOSE UR QL: NEGATIVE — SIGNIFICANT CHANGE UP
HCT VFR BLD CALC: 42.2 % — SIGNIFICANT CHANGE UP (ref 42–52)
HDLC SERPL-MCNC: 44 MG/DL — SIGNIFICANT CHANGE UP
HGB BLD-MCNC: 14.5 G/DL — SIGNIFICANT CHANGE UP (ref 14–18)
KETONES UR-MCNC: NEGATIVE — SIGNIFICANT CHANGE UP
LEUKOCYTE ESTERASE UR-ACNC: NEGATIVE — SIGNIFICANT CHANGE UP
LIPID PNL WITH DIRECT LDL SERPL: 93 MG/DL — SIGNIFICANT CHANGE UP
MAGNESIUM SERPL-MCNC: 2.3 MG/DL — SIGNIFICANT CHANGE UP (ref 1.8–2.4)
MCHC RBC-ENTMCNC: 32.7 PG — HIGH (ref 27–31)
MCHC RBC-ENTMCNC: 34.4 G/DL — SIGNIFICANT CHANGE UP (ref 32–37)
MCV RBC AUTO: 95.3 FL — HIGH (ref 80–94)
NITRITE UR-MCNC: NEGATIVE — SIGNIFICANT CHANGE UP
NON HDL CHOLESTEROL: 111 MG/DL — SIGNIFICANT CHANGE UP
NRBC # BLD: 0 /100 WBCS — SIGNIFICANT CHANGE UP (ref 0–0)
PH UR: 6.5 — SIGNIFICANT CHANGE UP (ref 5–8)
PHOSPHATE SERPL-MCNC: 3.2 MG/DL — SIGNIFICANT CHANGE UP (ref 2.1–4.9)
PLATELET # BLD AUTO: 137 K/UL — SIGNIFICANT CHANGE UP (ref 130–400)
PMV BLD: 11.6 FL — HIGH (ref 7.4–10.4)
POTASSIUM SERPL-MCNC: 4 MMOL/L — SIGNIFICANT CHANGE UP (ref 3.5–5)
POTASSIUM SERPL-SCNC: 4 MMOL/L — SIGNIFICANT CHANGE UP (ref 3.5–5)
PROT UR-MCNC: SIGNIFICANT CHANGE UP
RBC # BLD: 4.43 M/UL — LOW (ref 4.7–6.1)
RBC # FLD: 12.6 % — SIGNIFICANT CHANGE UP (ref 11.5–14.5)
SODIUM SERPL-SCNC: 148 MMOL/L — HIGH (ref 135–146)
SP GR SPEC: 1.01 — SIGNIFICANT CHANGE UP (ref 1.01–1.03)
TRIGL SERPL-MCNC: 87 MG/DL — SIGNIFICANT CHANGE UP
UROBILINOGEN FLD QL: SIGNIFICANT CHANGE UP
WBC # BLD: 7.26 K/UL — SIGNIFICANT CHANGE UP (ref 4.8–10.8)
WBC # FLD AUTO: 7.26 K/UL — SIGNIFICANT CHANGE UP (ref 4.8–10.8)

## 2023-05-11 PROCEDURE — 76775 US EXAM ABDO BACK WALL LIM: CPT | Mod: 26

## 2023-05-11 PROCEDURE — 99239 HOSP IP/OBS DSCHRG MGMT >30: CPT

## 2023-05-11 PROCEDURE — 93010 ELECTROCARDIOGRAM REPORT: CPT

## 2023-05-11 PROCEDURE — 99221 1ST HOSP IP/OBS SF/LOW 40: CPT

## 2023-05-11 RX ORDER — APIXABAN 2.5 MG/1
1 TABLET, FILM COATED ORAL
Refills: 0 | DISCHARGE

## 2023-05-11 RX ORDER — MONTELUKAST 4 MG/1
10 TABLET, CHEWABLE ORAL DAILY
Refills: 0 | Status: DISCONTINUED | OUTPATIENT
Start: 2023-05-11 | End: 2023-05-11

## 2023-05-11 RX ORDER — TAMSULOSIN HYDROCHLORIDE 0.4 MG/1
1 CAPSULE ORAL
Refills: 0 | DISCHARGE

## 2023-05-11 RX ORDER — CALCITRIOL 0.5 UG/1
1 CAPSULE ORAL
Refills: 0 | DISCHARGE

## 2023-05-11 RX ORDER — LOSARTAN POTASSIUM 100 MG/1
50 TABLET, FILM COATED ORAL DAILY
Refills: 0 | Status: DISCONTINUED | OUTPATIENT
Start: 2023-05-11 | End: 2023-05-11

## 2023-05-11 RX ORDER — LANOLIN ALCOHOL/MO/W.PET/CERES
3 CREAM (GRAM) TOPICAL AT BEDTIME
Refills: 0 | Status: DISCONTINUED | OUTPATIENT
Start: 2023-05-11 | End: 2023-05-11

## 2023-05-11 RX ORDER — TAMSULOSIN HYDROCHLORIDE 0.4 MG/1
0.4 CAPSULE ORAL AT BEDTIME
Refills: 0 | Status: DISCONTINUED | OUTPATIENT
Start: 2023-05-11 | End: 2023-05-11

## 2023-05-11 RX ORDER — SODIUM CHLORIDE 9 MG/ML
1000 INJECTION INTRAMUSCULAR; INTRAVENOUS; SUBCUTANEOUS
Refills: 0 | Status: DISCONTINUED | OUTPATIENT
Start: 2023-05-11 | End: 2023-05-11

## 2023-05-11 RX ORDER — CALCITRIOL 0.5 UG/1
0.25 CAPSULE ORAL DAILY
Refills: 0 | Status: DISCONTINUED | OUTPATIENT
Start: 2023-05-11 | End: 2023-05-11

## 2023-05-11 RX ORDER — DUTASTERIDE 0.5 MG/1
1 CAPSULE, LIQUID FILLED ORAL
Refills: 0 | DISCHARGE

## 2023-05-11 RX ORDER — ACETAMINOPHEN 500 MG
650 TABLET ORAL EVERY 6 HOURS
Refills: 0 | Status: DISCONTINUED | OUTPATIENT
Start: 2023-05-11 | End: 2023-05-11

## 2023-05-11 RX ORDER — DILTIAZEM HCL 120 MG
1 CAPSULE, EXT RELEASE 24 HR ORAL
Refills: 0 | DISCHARGE

## 2023-05-11 RX ORDER — DILTIAZEM HCL 120 MG
180 CAPSULE, EXT RELEASE 24 HR ORAL DAILY
Refills: 0 | Status: DISCONTINUED | OUTPATIENT
Start: 2023-05-11 | End: 2023-05-11

## 2023-05-11 RX ORDER — MONTELUKAST 4 MG/1
1 TABLET, CHEWABLE ORAL
Refills: 0 | DISCHARGE

## 2023-05-11 RX ORDER — APIXABAN 2.5 MG/1
2.5 TABLET, FILM COATED ORAL EVERY 12 HOURS
Refills: 0 | Status: DISCONTINUED | OUTPATIENT
Start: 2023-05-11 | End: 2023-05-11

## 2023-05-11 RX ORDER — LOSARTAN POTASSIUM 100 MG/1
1 TABLET, FILM COATED ORAL
Refills: 0 | DISCHARGE

## 2023-05-11 RX ORDER — FERROUS FUM/DOCUSATE/FOLIC AC 106-50-1MG
0 TABLET ORAL
Refills: 0 | DISCHARGE

## 2023-05-11 RX ORDER — SODIUM BICARBONATE 1 MEQ/ML
650 SYRINGE (ML) INTRAVENOUS DAILY
Refills: 0 | Status: DISCONTINUED | OUTPATIENT
Start: 2023-05-11 | End: 2023-05-11

## 2023-05-11 RX ORDER — SODIUM BICARBONATE 1 MEQ/ML
1 SYRINGE (ML) INTRAVENOUS
Refills: 0 | DISCHARGE

## 2023-05-11 RX ORDER — METOPROLOL TARTRATE 50 MG
25 TABLET ORAL
Refills: 0 | Status: DISCONTINUED | OUTPATIENT
Start: 2023-05-11 | End: 2023-05-11

## 2023-05-11 RX ADMIN — APIXABAN 2.5 MILLIGRAM(S): 2.5 TABLET, FILM COATED ORAL at 06:22

## 2023-05-11 RX ADMIN — SODIUM CHLORIDE 75 MILLILITER(S): 9 INJECTION INTRAMUSCULAR; INTRAVENOUS; SUBCUTANEOUS at 06:23

## 2023-05-11 RX ADMIN — Medication 25 MILLIGRAM(S): at 06:23

## 2023-05-11 NOTE — DISCHARGE NOTE PROVIDER - NSDCMRMEDTOKEN_GEN_ALL_CORE_FT
calcitriol 0.25 mcg oral capsule: 1 orally once a day  Cardizem  mg/24 hours oral capsule, extended release: 1 orally once a day  dutasteride 0.5 mg oral capsule: 1 orally once a day  Eliquis 2.5 mg oral tablet: 1 orally 2 times a day  losartan 50 mg oral tablet: 1 orally once a day  montelukast 10 mg oral tablet: 1 orally once a day  Nephro-Bradly  mg-1 mg oral tablet: orally  sodium bicarbonate 650 mg oral tablet: 1 orally once a day  tamsulosin 0.4 mg oral capsule: 1 orally once a day

## 2023-05-11 NOTE — DISCHARGE NOTE PROVIDER - HOSPITAL COURSE
#Dizziness  #Afib/flutter - unclear onset   On 5/10/23 had 2 hour episode of dizziness w no other symptoms and which self resolved.   CT Head done 5/10/23 in ED unremarkable. Neuro evaluated pt w rec for potential OP EEG.   - EKG done in ED w AF/flutter. At bedside monitor ED3 room 5 - seemed to be sinus, but after moving around and using urinal went into aflutter 110-120s.   - Cont Eliquis 2.5mg BID which pt revealed he was taking when I went to explain that he has an irregular heartbeat, but states he has not had irregular heartbeat before.   - Start metoprolol tartrate 25mg BID as pt intermittently tachycardic on minimal exertion   - EP consulted for new AF but please confer w pt's daughter/family re PMHx and Cardiologist contact info, pt may have had h/o AFib/flutter without realizing   - Echo to evaluate for HF, valve disease given murmur on exam   - If pt is paroxysmal AF should consider rhythm control   - Orthostatics  - PT consult    #Suspected CAD   #Chronic generalized weakness  Describes having full diet but seems to have limited exercise tolerance by description. Suspect underlying cardiac disease - pt initially denied having a cardiologist and then stated he does have one. CXR also w what appears to be calcified aorta.   Less likely nutritional deficiency as pt of reasonable build and no dietary restrictions, no anemia noted on labs though does have decreased MCV. Could be hypothyroidism but no constipation or weight gain.   - F/u Folate, B12, TSH   - Echo      #CKD   Unclear baseline Cr. Please clarify w family if possible.   Trend.    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 However, when informed of questionable lung mass noted on imaging, states that has been there for a long time and gets CT Chest w his Pulmonologist yearly, most recently a few months ago. Denies any medications for lungs.     #??R mastoid skin graft??  Unclear what surgery this pt is referring to. States tissue was taken from his leg and placed behind his R ear. Area looks depressed, but area well healed and w/o erythema, is clean, dry, intact, no evidence of infection. Potentially had h/o injury/infection/??OM and required skin graft?     #HTN - Losartan 50mg QD   #HLD - States he takes a medication for cholesterol - please clarify, but will start Atorvastatin 40mg qhs for now.   #hemorrhoidectomy - f/u as OP

## 2023-05-11 NOTE — DISCHARGE NOTE PROVIDER - NSDCFUADDAPPT_GEN_ALL_CORE_FT
Please follow up with your primary care doctor in one week and check basic metabolic panel.     Please drink 1.5L water daily for the next 3 days.

## 2023-05-11 NOTE — PHYSICAL THERAPY INITIAL EVALUATION ADULT - PERTINENT HX OF CURRENT PROBLEM, REHAB EVAL
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   Pt states he has some subacute-chronic weakness and maybe some SOB which is why he no longer leaves the house much as he cannot walk for too long. However, does not use any cane or walker. Has been feeling baseline until around 11am while preparing vegetables and started feeling "off" and lightheaded, so sat down and had not walked since due to fear he may fall. Denies any associated symptoms such as CP, SOB, n/v, abd pain, focal weakness numbness or tingling, speech or vision changes. Some time after arrival at hospital the dizziness self-resolved, lasting for a total of ~2 hours. States he had one episode in the past like this years ago for which he went to a hospital but cannot recall what happened there.

## 2023-05-11 NOTE — DISCHARGE NOTE PROVIDER - CARE PROVIDER_API CALL
Tirso Moraes)  Cardiovascular Disease; Internal Medicine  [address] [place] [address]  Phone: [phone]  Fax: [phone]  Follow Up Time: 2 weeks    Julito Burden)  Neurology  [address] [place] [address]  Phone: [phone]  Fax: [phone]  Follow Up Time: 2 weeks    Ok Esquivel)  Internal Medicine  [address] [place] [address] - Room [address] [place] [address]  Phone: [phone]  Fax: [phone]  Follow Up Time: 1 week

## 2023-05-11 NOTE — PROGRESS NOTE ADULT - PROBLEM SELECTOR PLAN 1
occurred while standing, cooking; sat down had moment of presyncope  cth neg  orthostatics neg  ekg noted; no events on tele  possible vagal event in setting of dehydration  daughter requesting for d/c today; she will f/u tte report with medical records tm and bring to pmd  needs outpt pmd f/u one week with bmp

## 2023-05-11 NOTE — PROGRESS NOTE ADULT - ASSESSMENT
88M PMHx HTN, CAD, AFib on eliquis, lung mass here with presyncope. ALESSANDRA, now resolved. Hypernatremia.

## 2023-05-11 NOTE — DISCHARGE NOTE NURSING/CASE MANAGEMENT/SOCIAL WORK - NSDCPEFALRISK_GEN_ALL_CORE
For information on Fall & Injury Prevention, visit: [email or web address] OR  [email or web address] OR  https://www.cdc.gov/steadi/patient.html

## 2023-05-11 NOTE — DISCHARGE NOTE NURSING/CASE MANAGEMENT/SOCIAL WORK - PATIENT PORTAL LINK FT
You can access the FollowMyHealth Patient Portal offered by [facility] by registering at the following website: [email or web address]. By joining [email or web address] [email or web address] portal, you will also be able to view your health information using other applications (apps) compatible with our system.

## 2023-05-11 NOTE — PROGRESS NOTE ADULT - SUBJECTIVE AND OBJECTIVE BOX
INTERVAL HPI/OVERNIGHT EVENTS:    SUBJECTIVE: Patient seen and examined at bedside.     cc: presyncope  no cp, sob, abd pain, fever  no ha, dizziness, lightheadedness, syncope    OBJECTIVE:    VITAL SIGNS:  Vital Signs Last 24 Hrs  T(C): 36.6 (11 May 2023 07:57), Max: 36.6 (11 May 2023 07:57)  T(F): 97.9 (11 May 2023 07:57), Max: 97.9 (11 May 2023 07:57)  HR: 66 (11 May 2023 07:57) (66 - 96)  BP: 164/92 (11 May 2023 07:57) (130/58 - 164/92)  BP(mean): 119 (11 May 2023 07:57) (115 - 119)  RR: 18 (11 May 2023 07:57) (16 - 18)  SpO2: 95% (11 May 2023 07:57) (95% - 98%)    Parameters below as of 11 May 2023 07:57  Patient On (Oxygen Delivery Method): room air          PHYSICAL EXAM:    General: NAD  HEENT: NC/AT; PERRL, clear conjunctiva  Neck: supple  Respiratory: CTA b/l  Cardiovascular: +S1/S2; RRR  Abdomen: soft, NT/ND; +BS x4  Extremities: WWP, 2+ peripheral pulses b/l; no LE edema  Skin: normal color and turgor; no rash  Neurological:    MEDICATIONS:  MEDICATIONS  (STANDING):  apixaban 2.5 milliGRAM(s) Oral every 12 hours  calcitriol   Capsule 0.25 MICROGram(s) Oral daily  diltiazem    milliGRAM(s) Oral daily  montelukast 10 milliGRAM(s) Oral daily  tamsulosin 0.4 milliGRAM(s) Oral at bedtime    MEDICATIONS  (PRN):  acetaminophen     Tablet .. 650 milliGRAM(s) Oral every 6 hours PRN Temp greater or equal to 38C (100.4F), Mild Pain (1 - 3)  melatonin 3 milliGRAM(s) Oral at bedtime PRN Insomnia      ALLERGIES:  Allergies    No Known Allergies    Intolerances        LABS:                        14.5   7.26  )-----------( 137      ( 11 May 2023 08:10 )             42.2     Hemoglobin: 14.5 g/dL (05-11 @ 08:10)  Hemoglobin: 14.2 g/dL (05-10 @ 16:48)    CBC Full  -  ( 11 May 2023 08:10 )  WBC Count : 7.26 K/uL  RBC Count : 4.43 M/uL  Hemoglobin : 14.5 g/dL  Hematocrit : 42.2 %  Platelet Count - Automated : 137 K/uL  Mean Cell Volume : 95.3 fL  Mean Cell Hemoglobin : 32.7 pg  Mean Cell Hemoglobin Concentration : 34.4 g/dL  Auto Neutrophil # : x  Auto Lymphocyte # : x  Auto Monocyte # : x  Auto Eosinophil # : x  Auto Basophil # : x  Auto Neutrophil % : x  Auto Lymphocyte % : x  Auto Monocyte % : x  Auto Eosinophil % : x  Auto Basophil % : x        148<H>  |  111<H>  |  26<H>  ----------------------------<  110<H>  4.0   |  22  |  1.5    Ca    9.1      11 May 2023 08:10  Phos  3.2       Mg     2.3         TPro  6.9  /  Alb  4.0  /  TBili  0.4  /  DBili  x   /  AST  16  /  ALT  9   /  AlkPhos  63  10    Creatinine Trend: 1.5<--, 1.7<--  LIVER FUNCTIONS - ( 10 May 2023 16:48 )  Alb: 4.0 g/dL / Pro: 6.9 g/dL / ALK PHOS: 63 U/L / ALT: 9 U/L / AST: 16 U/L / GGT: x               hs Troponin:            Urinalysis Basic - ( 10 May 2023 23:50 )    Color: Light Yellow / Appearance: Clear / S.009 / pH: x  Gluc: x / Ketone: Negative  / Bili: Negative / Urobili: <2 mg/dL   Blood: x / Protein: Trace / Nitrite: Negative   Leuk Esterase: Negative / RBC: x / WBC x   Sq Epi: x / Non Sq Epi: x / Bacteria: x      CSF:                      EKG:   MICROBIOLOGY:    IMAGING:      Labs, imaging, EKG personally reviewed    RADIOLOGY & ADDITIONAL TESTS: Reviewed.

## 2023-05-11 NOTE — DISCHARGE NOTE PROVIDER - PROVIDER TOKENS
PROVIDER:[TOKEN:[[id number]],FOLLOWUP:[2 weeks]],PROVIDER:[TOKEN:[[id number]],FOLLOWUP:[2 weeks]],PROVIDER:[TOKEN:[[id number]],FOLLOWUP:[1 week]]

## 2023-05-11 NOTE — PHYSICAL THERAPY INITIAL EVALUATION ADULT - ADDITIONAL COMMENTS
Patient lives with wife in daughter's house. [place] 6 steps to enter home. Was indepdnent in ADL's and ambulation no AD in house, uses cane in community. Has HHA 4 hours/day 5 days/week

## 2023-05-12 LAB — ACE SERPL-CCNC: 36 U/L — SIGNIFICANT CHANGE UP (ref 14–82)

## 2023-05-13 LAB
CULTURE RESULTS: SIGNIFICANT CHANGE UP
SPECIMEN SOURCE: SIGNIFICANT CHANGE UP

## 2023-05-14 LAB
GAMMA INTERFERON BACKGROUND BLD IA-ACNC: 0.01 IU/ML — SIGNIFICANT CHANGE UP
M TB IFN-G BLD-IMP: NEGATIVE — SIGNIFICANT CHANGE UP
M TB IFN-G CD4+ BCKGRND COR BLD-ACNC: 0.02 IU/ML — SIGNIFICANT CHANGE UP
M TB IFN-G CD4+CD8+ BCKGRND COR BLD-ACNC: 0.04 IU/ML — SIGNIFICANT CHANGE UP
QUANT TB PLUS MITOGEN MINUS NIL: 3.02 IU/ML — SIGNIFICANT CHANGE UP

## 2023-05-16 DIAGNOSIS — Z87.891 PERSONAL HISTORY OF NICOTINE DEPENDENCE: ICD-10-CM

## 2023-05-16 DIAGNOSIS — N18.9 CHRONIC KIDNEY DISEASE, UNSPECIFIED: ICD-10-CM

## 2023-05-16 DIAGNOSIS — I48.92 UNSPECIFIED ATRIAL FLUTTER: ICD-10-CM

## 2023-05-16 DIAGNOSIS — R91.8 OTHER NONSPECIFIC ABNORMAL FINDING OF LUNG FIELD: ICD-10-CM

## 2023-05-16 DIAGNOSIS — R55 SYNCOPE AND COLLAPSE: ICD-10-CM

## 2023-05-16 DIAGNOSIS — I48.20 CHRONIC ATRIAL FIBRILLATION, UNSPECIFIED: ICD-10-CM

## 2023-05-16 DIAGNOSIS — I12.9 HYPERTENSIVE CHRONIC KIDNEY DISEASE WITH STAGE 1 THROUGH STAGE 4 CHRONIC KIDNEY DISEASE, OR UNSPECIFIED CHRONIC KIDNEY DISEASE: ICD-10-CM

## 2023-05-16 DIAGNOSIS — E78.5 HYPERLIPIDEMIA, UNSPECIFIED: ICD-10-CM

## 2023-05-16 DIAGNOSIS — R42 DIZZINESS AND GIDDINESS: ICD-10-CM

## 2023-05-16 DIAGNOSIS — I70.0 ATHEROSCLEROSIS OF AORTA: ICD-10-CM

## 2023-05-16 DIAGNOSIS — R53.1 WEAKNESS: ICD-10-CM

## 2023-05-16 DIAGNOSIS — I25.10 ATHEROSCLEROTIC HEART DISEASE OF NATIVE CORONARY ARTERY WITHOUT ANGINA PECTORIS: ICD-10-CM

## 2024-08-12 PROBLEM — E78.5 HYPERLIPIDEMIA, UNSPECIFIED: Chronic | Status: ACTIVE | Noted: 2023-05-11

## 2024-08-12 PROBLEM — N18.9 CHRONIC KIDNEY DISEASE, UNSPECIFIED: Chronic | Status: ACTIVE | Noted: 2023-05-11

## 2024-08-12 PROBLEM — I10 ESSENTIAL (PRIMARY) HYPERTENSION: Chronic | Status: ACTIVE | Noted: 2023-05-11

## 2024-08-14 VITALS
OXYGEN SATURATION: 99 % | DIASTOLIC BLOOD PRESSURE: 117 MMHG | WEIGHT: 151.9 LBS | SYSTOLIC BLOOD PRESSURE: 201 MMHG | HEART RATE: 95 BPM | HEIGHT: 67 IN | TEMPERATURE: 98 F | RESPIRATION RATE: 18 BRPM

## 2024-08-14 RX ORDER — CLOPIDOGREL BISULFATE 75 MG/1
1 TABLET, FILM COATED ORAL
Refills: 0 | DISCHARGE

## 2024-08-23 ENCOUNTER — INPATIENT (INPATIENT)
Facility: HOSPITAL | Age: 89
LOS: 0 days | Discharge: HOME | End: 2024-08-24
Attending: STUDENT IN AN ORGANIZED HEALTH CARE EDUCATION/TRAINING PROGRAM | Admitting: STUDENT IN AN ORGANIZED HEALTH CARE EDUCATION/TRAINING PROGRAM
Payer: MEDICARE

## 2024-08-23 DIAGNOSIS — Z98.890 OTHER SPECIFIED POSTPROCEDURAL STATES: Chronic | ICD-10-CM

## 2024-08-23 PROBLEM — I48.91 UNSPECIFIED ATRIAL FIBRILLATION: Chronic | Status: INACTIVE | Noted: 2023-05-11 | Resolved: 2024-08-23

## 2024-08-23 LAB
A1C WITH ESTIMATED AVERAGE GLUCOSE RESULT: 5.3 % — SIGNIFICANT CHANGE UP (ref 4–5.6)
ALBUMIN SERPL ELPH-MCNC: 4 G/DL — SIGNIFICANT CHANGE UP (ref 3.3–5)
ALP SERPL-CCNC: 78 U/L — SIGNIFICANT CHANGE UP (ref 40–120)
ALT FLD-CCNC: 13 U/L — SIGNIFICANT CHANGE UP (ref 10–45)
ANION GAP SERPL CALC-SCNC: 12 MMOL/L — SIGNIFICANT CHANGE UP (ref 5–17)
APTT BLD: 32.9 SEC — SIGNIFICANT CHANGE UP (ref 24.5–35.6)
AST SERPL-CCNC: 18 U/L — SIGNIFICANT CHANGE UP (ref 10–40)
BASOPHILS # BLD AUTO: 0.05 K/UL — SIGNIFICANT CHANGE UP (ref 0–0.2)
BASOPHILS NFR BLD AUTO: 0.7 % — SIGNIFICANT CHANGE UP (ref 0–2)
BILIRUB SERPL-MCNC: 0.6 MG/DL — SIGNIFICANT CHANGE UP (ref 0.2–1.2)
BUN SERPL-MCNC: 22 MG/DL — SIGNIFICANT CHANGE UP (ref 7–23)
CALCIUM SERPL-MCNC: 8.9 MG/DL — SIGNIFICANT CHANGE UP (ref 8.4–10.5)
CHLORIDE SERPL-SCNC: 106 MMOL/L — SIGNIFICANT CHANGE UP (ref 96–108)
CHOLEST SERPL-MCNC: 202 MG/DL — HIGH
CK MB CFR SERPL CALC: 1.9 NG/ML — SIGNIFICANT CHANGE UP (ref 0–6.7)
CK SERPL-CCNC: 91 U/L — SIGNIFICANT CHANGE UP (ref 30–200)
CO2 SERPL-SCNC: 23 MMOL/L — SIGNIFICANT CHANGE UP (ref 22–31)
COHGB MFR BLDA: 1.3 % — SIGNIFICANT CHANGE UP
COHGB MFR BLDA: 1.5 % — SIGNIFICANT CHANGE UP
COHGB MFR BLDV: 1.3 % — SIGNIFICANT CHANGE UP
COHGB MFR BLDV: 1.5 % — SIGNIFICANT CHANGE UP
COHGB MFR BLDV: 1.5 % — SIGNIFICANT CHANGE UP
CREAT SERPL-MCNC: 1.82 MG/DL — HIGH (ref 0.5–1.3)
EGFR: 35 ML/MIN/1.73M2 — LOW
EGFR: 35 ML/MIN/1.73M2 — LOW
EOSINOPHIL # BLD AUTO: 0.18 K/UL — SIGNIFICANT CHANGE UP (ref 0–0.5)
EOSINOPHIL NFR BLD AUTO: 2.3 % — SIGNIFICANT CHANGE UP (ref 0–6)
ESTIMATED AVERAGE GLUCOSE: 105 MG/DL — SIGNIFICANT CHANGE UP (ref 68–114)
GLUCOSE SERPL-MCNC: 112 MG/DL — HIGH (ref 70–99)
HCT VFR BLD CALC: 44.8 % — SIGNIFICANT CHANGE UP (ref 39–50)
HDLC SERPL-MCNC: 50 MG/DL — SIGNIFICANT CHANGE UP
HGB BLD CALC-MCNC: 13.5 G/DL — SIGNIFICANT CHANGE UP (ref 12.6–17.4)
HGB BLD CALC-MCNC: 14.1 G/DL — SIGNIFICANT CHANGE UP (ref 12.6–17.4)
HGB BLD CALC-MCNC: 14.5 G/DL — SIGNIFICANT CHANGE UP (ref 12.6–17.4)
HGB BLD-MCNC: 15.1 G/DL — SIGNIFICANT CHANGE UP (ref 13–17)
HGB BLDA-MCNC: 13.8 G/DL — SIGNIFICANT CHANGE UP (ref 12.6–17.4)
HGB BLDA-MCNC: 14.5 G/DL — SIGNIFICANT CHANGE UP (ref 12.6–17.4)
IMM GRANULOCYTES NFR BLD AUTO: 0.4 % — SIGNIFICANT CHANGE UP (ref 0–0.9)
INR BLD: 0.86 — SIGNIFICANT CHANGE UP (ref 0.85–1.18)
ISTAT INR: 1 — SIGNIFICANT CHANGE UP (ref 0.88–1.16)
ISTAT PT: 10.5 SEC — SIGNIFICANT CHANGE UP (ref 10–12.9)
LDLC SERPL-MCNC: 130 MG/DL — HIGH
LIPID PNL WITH DIRECT LDL SERPL: 130 MG/DL — HIGH
LYMPHOCYTES # BLD AUTO: 2.07 K/UL — SIGNIFICANT CHANGE UP (ref 1–3.3)
LYMPHOCYTES # BLD AUTO: 27 % — SIGNIFICANT CHANGE UP (ref 13–44)
MAGNESIUM SERPL-MCNC: 2.4 MG/DL — SIGNIFICANT CHANGE UP (ref 1.6–2.6)
MCHC RBC-ENTMCNC: 31.9 PG — SIGNIFICANT CHANGE UP (ref 27–34)
MCHC RBC-ENTMCNC: 33.7 GM/DL — SIGNIFICANT CHANGE UP (ref 32–36)
MCV RBC AUTO: 94.7 FL — SIGNIFICANT CHANGE UP (ref 80–100)
METHGB MFR BLDA: 0.6 % — SIGNIFICANT CHANGE UP
METHGB MFR BLDA: 1 % — SIGNIFICANT CHANGE UP
METHGB MFR BLDV: 0.6 % — SIGNIFICANT CHANGE UP
METHGB MFR BLDV: 0.7 % — SIGNIFICANT CHANGE UP
METHGB MFR BLDV: 0.8 % — SIGNIFICANT CHANGE UP
MONOCYTES # BLD AUTO: 0.61 K/UL — SIGNIFICANT CHANGE UP (ref 0–0.9)
MONOCYTES NFR BLD AUTO: 8 % — SIGNIFICANT CHANGE UP (ref 2–14)
NEUTROPHILS # BLD AUTO: 4.72 K/UL — SIGNIFICANT CHANGE UP (ref 1.8–7.4)
NEUTROPHILS NFR BLD AUTO: 61.6 % — SIGNIFICANT CHANGE UP (ref 43–77)
NONHDLC SERPL-MCNC: 152 MG/DL — HIGH
NRBC # BLD: 0 /100 WBCS — SIGNIFICANT CHANGE UP (ref 0–0)
NRBC BLD-RTO: 0 /100 WBCS — SIGNIFICANT CHANGE UP (ref 0–0)
OXYHGB MFR BLDA: 90.7 % — SIGNIFICANT CHANGE UP (ref 90–95)
OXYHGB MFR BLDA: 96.5 % — HIGH (ref 90–95)
PLATELET # BLD AUTO: 119 K/UL — LOW (ref 150–400)
POTASSIUM SERPL-MCNC: 3.3 MMOL/L — LOW (ref 3.5–5.3)
POTASSIUM SERPL-SCNC: 3.3 MMOL/L — LOW (ref 3.5–5.3)
PROT SERPL-MCNC: 7.4 G/DL — SIGNIFICANT CHANGE UP (ref 6–8.3)
PROTHROM AB SERPL-ACNC: 9.9 SEC — SIGNIFICANT CHANGE UP (ref 9.5–13)
RBC # BLD: 4.73 M/UL — SIGNIFICANT CHANGE UP (ref 4.2–5.8)
RBC # FLD: 13.3 % — SIGNIFICANT CHANGE UP (ref 10.3–14.5)
SAO2 % BLDA: 92.6 % — LOW (ref 94–98)
SAO2 % BLDA: 98.8 % — HIGH (ref 94–98)
SAO2 % BLDV: 60 % — LOW (ref 67–88)
SAO2 % BLDV: 64 % — LOW (ref 67–88)
SAO2 % BLDV: 68 % — SIGNIFICANT CHANGE UP (ref 67–88)
SODIUM SERPL-SCNC: 141 MMOL/L — SIGNIFICANT CHANGE UP (ref 135–145)
TRIGL SERPL-MCNC: 120 MG/DL — SIGNIFICANT CHANGE UP
WBC # BLD: 7.66 K/UL — SIGNIFICANT CHANGE UP (ref 3.8–10.5)
WBC # FLD AUTO: 7.66 K/UL — SIGNIFICANT CHANGE UP (ref 3.8–10.5)

## 2024-08-23 PROCEDURE — 0523T NTRAPX C FFR W/3D FUNCJL MAP: CPT

## 2024-08-23 PROCEDURE — 99152 MOD SED SAME PHYS/QHP 5/>YRS: CPT

## 2024-08-23 PROCEDURE — 93460 R&L HRT ART/VENTRICLE ANGIO: CPT | Mod: 26,59

## 2024-08-23 PROCEDURE — 92933 PRQ TRLML C ATHRC ST ANGIOP1: CPT | Mod: RC

## 2024-08-23 RX ORDER — SODIUM BICARBONATE 1 MEQ/ML
650 SYRINGE (ML) INTRAVENOUS DAILY
Refills: 0 | Status: DISCONTINUED | OUTPATIENT
Start: 2024-08-23 | End: 2024-08-24

## 2024-08-23 RX ORDER — LABETALOL HYDROCHLORIDE 200 MG/1
10 TABLET, FILM COATED ORAL ONCE
Refills: 0 | Status: COMPLETED | OUTPATIENT
Start: 2024-08-23 | End: 2024-08-23

## 2024-08-23 RX ORDER — TAMSULOSIN HYDROCHLORIDE 0.4 MG/1
0.4 CAPSULE ORAL AT BEDTIME
Refills: 0 | Status: DISCONTINUED | OUTPATIENT
Start: 2024-08-23 | End: 2024-08-24

## 2024-08-23 RX ORDER — MONTELUKAST SODIUM 10 MG/1
1 TABLET ORAL
Refills: 0 | DISCHARGE

## 2024-08-23 RX ORDER — CARVEDILOL 3.12 MG/1
6.25 TABLET, FILM COATED ORAL EVERY 12 HOURS
Refills: 0 | Status: DISCONTINUED | OUTPATIENT
Start: 2024-08-23 | End: 2024-08-24

## 2024-08-23 RX ORDER — APIXABAN 2.5 MG/1
2.5 TABLET, FILM COATED ORAL EVERY 12 HOURS
Refills: 0 | Status: DISCONTINUED | OUTPATIENT
Start: 2024-08-24 | End: 2024-08-24

## 2024-08-23 RX ORDER — MONTELUKAST SODIUM 10 MG/1
10 TABLET ORAL DAILY
Refills: 0 | Status: DISCONTINUED | OUTPATIENT
Start: 2024-08-23 | End: 2024-08-24

## 2024-08-23 RX ORDER — CLOPIDOGREL BISULFATE 75 MG/1
75 TABLET, FILM COATED ORAL DAILY
Refills: 0 | Status: DISCONTINUED | OUTPATIENT
Start: 2024-08-24 | End: 2024-08-24

## 2024-08-23 RX ORDER — ATORVASTATIN CALCIUM 80 MG/1
20 TABLET, FILM COATED ORAL AT BEDTIME
Refills: 0 | Status: DISCONTINUED | OUTPATIENT
Start: 2024-08-23 | End: 2024-08-24

## 2024-08-23 RX ORDER — LOSARTAN POTASSIUM 100 MG/1
50 TABLET, FILM COATED ORAL DAILY
Refills: 0 | Status: DISCONTINUED | OUTPATIENT
Start: 2024-08-24 | End: 2024-08-24

## 2024-08-23 RX ORDER — ASPIRIN 325 MG
325 TABLET ORAL ONCE
Refills: 0 | Status: COMPLETED | OUTPATIENT
Start: 2024-08-23 | End: 2024-08-23

## 2024-08-23 RX ORDER — LABETALOL HYDROCHLORIDE 200 MG/1
5 TABLET, FILM COATED ORAL ONCE
Refills: 0 | Status: DISCONTINUED | OUTPATIENT
Start: 2024-08-23 | End: 2024-08-23

## 2024-08-23 RX ORDER — DAPAGLIFLOZIN 5 MG/1
10 TABLET, FILM COATED ORAL DAILY
Refills: 0 | Status: DISCONTINUED | OUTPATIENT
Start: 2024-08-23 | End: 2024-08-24

## 2024-08-23 RX ORDER — FINASTERIDE 1 MG/1
5 TABLET, FILM COATED ORAL DAILY
Refills: 0 | Status: DISCONTINUED | OUTPATIENT
Start: 2024-08-23 | End: 2024-08-24

## 2024-08-23 RX ORDER — CLOPIDOGREL BISULFATE 75 MG/1
600 TABLET, FILM COATED ORAL ONCE
Refills: 0 | Status: COMPLETED | OUTPATIENT
Start: 2024-08-23 | End: 2024-08-23

## 2024-08-23 RX ORDER — LOSARTAN POTASSIUM 100 MG/1
50 TABLET, FILM COATED ORAL ONCE
Refills: 0 | Status: COMPLETED | OUTPATIENT
Start: 2024-08-23 | End: 2024-08-23

## 2024-08-23 RX ORDER — ASPIRIN 325 MG
81 TABLET ORAL DAILY
Refills: 0 | Status: DISCONTINUED | OUTPATIENT
Start: 2024-08-24 | End: 2024-08-24

## 2024-08-23 RX ORDER — DILTIAZEM HYDROCHLORIDE 240 MG/1
180 TABLET, EXTENDED RELEASE ORAL DAILY
Refills: 0 | Status: DISCONTINUED | OUTPATIENT
Start: 2024-08-24 | End: 2024-08-24

## 2024-08-23 RX ORDER — LOSARTAN POTASSIUM 100 MG/1
1 TABLET, FILM COATED ORAL
Refills: 0 | DISCHARGE

## 2024-08-23 RX ADMIN — DAPAGLIFLOZIN 10 MILLIGRAM(S): 5 TABLET, FILM COATED ORAL at 18:23

## 2024-08-23 RX ADMIN — Medication 40 MILLIEQUIVALENT(S): at 12:45

## 2024-08-23 RX ADMIN — Medication 10 MILLIGRAM(S): at 20:39

## 2024-08-23 RX ADMIN — CLOPIDOGREL BISULFATE 600 MILLIGRAM(S): 75 TABLET, FILM COATED ORAL at 08:43

## 2024-08-23 RX ADMIN — LABETALOL HYDROCHLORIDE 10 MILLIGRAM(S): 200 TABLET, FILM COATED ORAL at 13:35

## 2024-08-23 RX ADMIN — LABETALOL HYDROCHLORIDE 10 MILLIGRAM(S): 200 TABLET, FILM COATED ORAL at 07:58

## 2024-08-23 RX ADMIN — ATORVASTATIN CALCIUM 20 MILLIGRAM(S): 80 TABLET, FILM COATED ORAL at 21:11

## 2024-08-23 RX ADMIN — Medication 70 MILLILITER(S): at 16:55

## 2024-08-23 RX ADMIN — Medication 40 MILLIEQUIVALENT(S): at 07:57

## 2024-08-23 RX ADMIN — CARVEDILOL 6.25 MILLIGRAM(S): 3.12 TABLET, FILM COATED ORAL at 18:23

## 2024-08-23 RX ADMIN — MONTELUKAST SODIUM 10 MILLIGRAM(S): 10 TABLET ORAL at 18:32

## 2024-08-23 RX ADMIN — Medication 325 MILLIGRAM(S): at 08:43

## 2024-08-23 RX ADMIN — LOSARTAN POTASSIUM 50 MILLIGRAM(S): 100 TABLET, FILM COATED ORAL at 12:48

## 2024-08-23 RX ADMIN — FINASTERIDE 5 MILLIGRAM(S): 1 TABLET, FILM COATED ORAL at 18:23

## 2024-08-23 RX ADMIN — TAMSULOSIN HYDROCHLORIDE 0.4 MILLIGRAM(S): 0.4 CAPSULE ORAL at 21:11

## 2024-08-24 VITALS
SYSTOLIC BLOOD PRESSURE: 166 MMHG | OXYGEN SATURATION: 93 % | RESPIRATION RATE: 18 BRPM | DIASTOLIC BLOOD PRESSURE: 87 MMHG | HEART RATE: 82 BPM

## 2024-08-24 LAB
ANION GAP SERPL CALC-SCNC: 10 MMOL/L — SIGNIFICANT CHANGE UP (ref 5–17)
BUN SERPL-MCNC: 26 MG/DL — HIGH (ref 7–23)
CALCIUM SERPL-MCNC: 8.3 MG/DL — LOW (ref 8.4–10.5)
CHLORIDE SERPL-SCNC: 108 MMOL/L — SIGNIFICANT CHANGE UP (ref 96–108)
CO2 SERPL-SCNC: 20 MMOL/L — LOW (ref 22–31)
CREAT SERPL-MCNC: 1.71 MG/DL — HIGH (ref 0.5–1.3)
EGFR: 38 ML/MIN/1.73M2 — LOW
EGFR: 38 ML/MIN/1.73M2 — LOW
GLUCOSE SERPL-MCNC: 112 MG/DL — HIGH (ref 70–99)
HCT VFR BLD CALC: 42 % — SIGNIFICANT CHANGE UP (ref 39–50)
HGB BLD-MCNC: 13.2 G/DL — SIGNIFICANT CHANGE UP (ref 13–17)
MAGNESIUM SERPL-MCNC: 2.3 MG/DL — SIGNIFICANT CHANGE UP (ref 1.6–2.6)
MCHC RBC-ENTMCNC: 30.3 PG — SIGNIFICANT CHANGE UP (ref 27–34)
MCHC RBC-ENTMCNC: 31.4 GM/DL — LOW (ref 32–36)
MCV RBC AUTO: 96.6 FL — SIGNIFICANT CHANGE UP (ref 80–100)
NRBC # BLD: 0 /100 WBCS — SIGNIFICANT CHANGE UP (ref 0–0)
NRBC BLD-RTO: 0 /100 WBCS — SIGNIFICANT CHANGE UP (ref 0–0)
PLATELET # BLD AUTO: 111 K/UL — LOW (ref 150–400)
POTASSIUM SERPL-MCNC: 4.2 MMOL/L — SIGNIFICANT CHANGE UP (ref 3.5–5.3)
POTASSIUM SERPL-SCNC: 4.2 MMOL/L — SIGNIFICANT CHANGE UP (ref 3.5–5.3)
RBC # BLD: 4.35 M/UL — SIGNIFICANT CHANGE UP (ref 4.2–5.8)
RBC # FLD: 13.5 % — SIGNIFICANT CHANGE UP (ref 10.3–14.5)
SODIUM SERPL-SCNC: 138 MMOL/L — SIGNIFICANT CHANGE UP (ref 135–145)
WBC # BLD: 8.67 K/UL — SIGNIFICANT CHANGE UP (ref 3.8–10.5)
WBC # FLD AUTO: 8.67 K/UL — SIGNIFICANT CHANGE UP (ref 3.8–10.5)

## 2024-08-24 PROCEDURE — 83036 HEMOGLOBIN GLYCOSYLATED A1C: CPT

## 2024-08-24 PROCEDURE — 82550 ASSAY OF CK (CPK): CPT

## 2024-08-24 PROCEDURE — C1874: CPT

## 2024-08-24 PROCEDURE — C1724: CPT

## 2024-08-24 PROCEDURE — C1760: CPT

## 2024-08-24 PROCEDURE — 85610 PROTHROMBIN TIME: CPT

## 2024-08-24 PROCEDURE — 82803 BLOOD GASES ANY COMBINATION: CPT

## 2024-08-24 PROCEDURE — 80048 BASIC METABOLIC PNL TOTAL CA: CPT

## 2024-08-24 PROCEDURE — 82553 CREATINE MB FRACTION: CPT

## 2024-08-24 PROCEDURE — 85730 THROMBOPLASTIN TIME PARTIAL: CPT

## 2024-08-24 PROCEDURE — 99239 HOSP IP/OBS DSCHRG MGMT >30: CPT

## 2024-08-24 PROCEDURE — 85027 COMPLETE CBC AUTOMATED: CPT

## 2024-08-24 PROCEDURE — 85025 COMPLETE CBC W/AUTO DIFF WBC: CPT

## 2024-08-24 PROCEDURE — C1887: CPT

## 2024-08-24 PROCEDURE — C1889: CPT

## 2024-08-24 PROCEDURE — C1894: CPT

## 2024-08-24 PROCEDURE — C1769: CPT

## 2024-08-24 PROCEDURE — 80053 COMPREHEN METABOLIC PANEL: CPT

## 2024-08-24 PROCEDURE — C1725: CPT

## 2024-08-24 PROCEDURE — 83735 ASSAY OF MAGNESIUM: CPT

## 2024-08-24 PROCEDURE — 36415 COLL VENOUS BLD VENIPUNCTURE: CPT

## 2024-08-24 PROCEDURE — 80061 LIPID PANEL: CPT

## 2024-08-24 RX ORDER — APIXABAN 2.5 MG/1
1 TABLET, FILM COATED ORAL
Qty: 60 | Refills: 11
Start: 2024-08-24 | End: 2025-08-18

## 2024-08-24 RX ORDER — ATORVASTATIN CALCIUM 80 MG/1
1 TABLET, FILM COATED ORAL
Qty: 30 | Refills: 0
Start: 2024-08-24 | End: 2024-09-22

## 2024-08-24 RX ORDER — CLOPIDOGREL BISULFATE 75 MG/1
1 TABLET, FILM COATED ORAL
Qty: 30 | Refills: 11
Start: 2024-08-24 | End: 2025-08-18

## 2024-08-24 RX ORDER — ATORVASTATIN CALCIUM 80 MG/1
1 TABLET, FILM COATED ORAL
Qty: 30 | Refills: 3
Start: 2024-08-24 | End: 2024-12-21

## 2024-08-24 RX ORDER — CARVEDILOL 3.12 MG/1
1 TABLET, FILM COATED ORAL
Qty: 60 | Refills: 0
Start: 2024-08-24 | End: 2024-09-22

## 2024-08-24 RX ORDER — CARVEDILOL 3.12 MG/1
6.25 TABLET, FILM COATED ORAL ONCE
Refills: 0 | Status: DISCONTINUED | OUTPATIENT
Start: 2024-08-24 | End: 2024-08-24

## 2024-08-24 RX ORDER — CARVEDILOL 3.12 MG/1
1 TABLET, FILM COATED ORAL
Qty: 1 | Refills: 0
Start: 2024-08-24 | End: 2024-09-22

## 2024-08-24 RX ORDER — CARVEDILOL 3.12 MG/1
1 TABLET, FILM COATED ORAL
Qty: 60 | Refills: 3
Start: 2024-08-24 | End: 2024-12-21

## 2024-08-24 RX ORDER — CLOPIDOGREL BISULFATE 75 MG/1
1 TABLET, FILM COATED ORAL
Qty: 30 | Refills: 0
Start: 2024-08-24 | End: 2024-09-22

## 2024-08-24 RX ORDER — ASPIRIN 325 MG
1 TABLET ORAL
Qty: 30 | Refills: 0
Start: 2024-08-24 | End: 2024-09-22

## 2024-08-24 RX ORDER — ASPIRIN 325 MG
1 TABLET ORAL
Qty: 30 | Refills: 11
Start: 2024-08-24 | End: 2025-08-18

## 2024-08-24 RX ORDER — DILTIAZEM HYDROCHLORIDE 240 MG/1
180 TABLET, EXTENDED RELEASE ORAL DAILY
Refills: 0 | Status: DISCONTINUED | OUTPATIENT
Start: 2024-08-24 | End: 2024-08-24

## 2024-08-24 RX ORDER — BACITRACIN ZINC AND POLYMYXIN B SULFATE 500; 10000 [USP'U]/G; [USP'U]/G
1 OINTMENT TOPICAL ONCE
Refills: 0 | Status: COMPLETED | OUTPATIENT
Start: 2024-08-24 | End: 2024-08-24

## 2024-08-24 RX ADMIN — Medication 10 MILLIGRAM(S): at 11:24

## 2024-08-24 RX ADMIN — FINASTERIDE 5 MILLIGRAM(S): 1 TABLET, FILM COATED ORAL at 11:25

## 2024-08-24 RX ADMIN — DAPAGLIFLOZIN 10 MILLIGRAM(S): 5 TABLET, FILM COATED ORAL at 11:24

## 2024-08-24 RX ADMIN — CARVEDILOL 6.25 MILLIGRAM(S): 3.12 TABLET, FILM COATED ORAL at 05:52

## 2024-08-24 RX ADMIN — CLOPIDOGREL BISULFATE 75 MILLIGRAM(S): 75 TABLET, FILM COATED ORAL at 11:24

## 2024-08-24 RX ADMIN — APIXABAN 2.5 MILLIGRAM(S): 2.5 TABLET, FILM COATED ORAL at 05:50

## 2024-08-24 RX ADMIN — LOSARTAN POTASSIUM 50 MILLIGRAM(S): 100 TABLET, FILM COATED ORAL at 05:51

## 2024-08-24 RX ADMIN — Medication 10 MILLIGRAM(S): at 01:36

## 2024-08-24 RX ADMIN — BACITRACIN ZINC AND POLYMYXIN B SULFATE 1 APPLICATION(S): 500; 10000 OINTMENT TOPICAL at 12:41

## 2024-08-24 RX ADMIN — DILTIAZEM HYDROCHLORIDE 180 MILLIGRAM(S): 240 TABLET, EXTENDED RELEASE ORAL at 12:41

## 2024-08-24 RX ADMIN — MONTELUKAST SODIUM 10 MILLIGRAM(S): 10 TABLET ORAL at 11:24

## 2024-08-24 RX ADMIN — Medication 81 MILLIGRAM(S): at 11:24

## 2024-08-30 ENCOUNTER — INPATIENT (INPATIENT)
Facility: HOSPITAL | Age: 89
LOS: 4 days | Discharge: HOME HEALTH | DRG: 308 | End: 2024-09-04
Attending: STUDENT IN AN ORGANIZED HEALTH CARE EDUCATION/TRAINING PROGRAM | Admitting: STUDENT IN AN ORGANIZED HEALTH CARE EDUCATION/TRAINING PROGRAM
Payer: MEDICARE

## 2024-08-30 VITALS
SYSTOLIC BLOOD PRESSURE: 85 MMHG | DIASTOLIC BLOOD PRESSURE: 54 MMHG | HEART RATE: 80 BPM | RESPIRATION RATE: 18 BRPM | OXYGEN SATURATION: 98 % | TEMPERATURE: 97 F

## 2024-08-30 DIAGNOSIS — E55.9 VITAMIN D DEFICIENCY, UNSPECIFIED: ICD-10-CM

## 2024-08-30 DIAGNOSIS — I12.9 HYPERTENSIVE CHRONIC KIDNEY DISEASE WITH STAGE 1 THROUGH STAGE 4 CHRONIC KIDNEY DISEASE, OR UNSPECIFIED CHRONIC KIDNEY DISEASE: ICD-10-CM

## 2024-08-30 DIAGNOSIS — I48.91 UNSPECIFIED ATRIAL FIBRILLATION: ICD-10-CM

## 2024-08-30 DIAGNOSIS — I25.10 ATHEROSCLEROTIC HEART DISEASE OF NATIVE CORONARY ARTERY WITHOUT ANGINA PECTORIS: ICD-10-CM

## 2024-08-30 DIAGNOSIS — E78.5 HYPERLIPIDEMIA, UNSPECIFIED: ICD-10-CM

## 2024-08-30 DIAGNOSIS — B18.1 CHRONIC VIRAL HEPATITIS B WITHOUT DELTA-AGENT: ICD-10-CM

## 2024-08-30 DIAGNOSIS — N40.0 BENIGN PROSTATIC HYPERPLASIA WITHOUT LOWER URINARY TRACT SYMPTOMS: ICD-10-CM

## 2024-08-30 DIAGNOSIS — Z87.891 PERSONAL HISTORY OF NICOTINE DEPENDENCE: ICD-10-CM

## 2024-08-30 DIAGNOSIS — Z79.01 LONG TERM (CURRENT) USE OF ANTICOAGULANTS: ICD-10-CM

## 2024-08-30 DIAGNOSIS — Z98.890 OTHER SPECIFIED POSTPROCEDURAL STATES: Chronic | ICD-10-CM

## 2024-08-30 DIAGNOSIS — I08.2 RHEUMATIC DISORDERS OF BOTH AORTIC AND TRICUSPID VALVES: ICD-10-CM

## 2024-08-30 DIAGNOSIS — I48.0 PAROXYSMAL ATRIAL FIBRILLATION: ICD-10-CM

## 2024-08-30 DIAGNOSIS — N18.32 CHRONIC KIDNEY DISEASE, STAGE 3B: ICD-10-CM

## 2024-08-30 PROBLEM — Z86.79 PERSONAL HISTORY OF OTHER DISEASES OF THE CIRCULATORY SYSTEM: Chronic | Status: ACTIVE | Noted: 2024-08-14

## 2024-08-30 PROBLEM — I07.1 RHEUMATIC TRICUSPID INSUFFICIENCY: Chronic | Status: ACTIVE | Noted: 2024-08-14

## 2024-08-30 PROBLEM — I35.0 NONRHEUMATIC AORTIC (VALVE) STENOSIS: Chronic | Status: ACTIVE | Noted: 2024-08-14

## 2024-08-30 LAB
ALBUMIN SERPL ELPH-MCNC: 3.6 G/DL — SIGNIFICANT CHANGE UP (ref 3.5–5.2)
ALBUMIN SERPL ELPH-MCNC: 4 G/DL — SIGNIFICANT CHANGE UP (ref 3.5–5.2)
ALP SERPL-CCNC: 113 U/L — SIGNIFICANT CHANGE UP (ref 30–115)
ALP SERPL-CCNC: 126 U/L — HIGH (ref 30–115)
ALT FLD-CCNC: 70 U/L — HIGH (ref 0–41)
ALT FLD-CCNC: 78 U/L — HIGH (ref 0–41)
ANION GAP SERPL CALC-SCNC: 13 MMOL/L — SIGNIFICANT CHANGE UP (ref 7–14)
ANION GAP SERPL CALC-SCNC: 16 MMOL/L — HIGH (ref 7–14)
ANION GAP SERPL CALC-SCNC: 16 MMOL/L — HIGH (ref 7–14)
APTT BLD: 31.7 SEC — SIGNIFICANT CHANGE UP (ref 27–39.2)
APTT BLD: 33.3 SEC — SIGNIFICANT CHANGE UP (ref 27–39.2)
AST SERPL-CCNC: 73 U/L — HIGH (ref 0–41)
AST SERPL-CCNC: 99 U/L — HIGH (ref 0–41)
BASOPHILS # BLD AUTO: 0.04 K/UL — SIGNIFICANT CHANGE UP (ref 0–0.2)
BASOPHILS NFR BLD AUTO: 0.4 % — SIGNIFICANT CHANGE UP (ref 0–1)
BILIRUB SERPL-MCNC: 1.1 MG/DL — SIGNIFICANT CHANGE UP (ref 0.2–1.2)
BILIRUB SERPL-MCNC: 1.7 MG/DL — HIGH (ref 0.2–1.2)
BUN SERPL-MCNC: 32 MG/DL — HIGH (ref 10–20)
BUN SERPL-MCNC: 34 MG/DL — HIGH (ref 10–20)
BUN SERPL-MCNC: 41 MG/DL — HIGH (ref 10–20)
CALCIUM SERPL-MCNC: 6.3 MG/DL — LOW (ref 8.4–10.5)
CALCIUM SERPL-MCNC: 8.1 MG/DL — LOW (ref 8.4–10.5)
CALCIUM SERPL-MCNC: 8.3 MG/DL — LOW (ref 8.4–10.4)
CHLORIDE SERPL-SCNC: 101 MMOL/L — SIGNIFICANT CHANGE UP (ref 98–110)
CHLORIDE SERPL-SCNC: 101 MMOL/L — SIGNIFICANT CHANGE UP (ref 98–110)
CHLORIDE SERPL-SCNC: 109 MMOL/L — SIGNIFICANT CHANGE UP (ref 98–110)
CO2 SERPL-SCNC: 14 MMOL/L — LOW (ref 17–32)
CO2 SERPL-SCNC: 15 MMOL/L — LOW (ref 17–32)
CO2 SERPL-SCNC: 17 MMOL/L — SIGNIFICANT CHANGE UP (ref 17–32)
CREAT SERPL-MCNC: 2.5 MG/DL — HIGH (ref 0.7–1.5)
CREAT SERPL-MCNC: 3 MG/DL — HIGH (ref 0.7–1.5)
CREAT SERPL-MCNC: 3.3 MG/DL — HIGH (ref 0.7–1.5)
EGFR: 17 ML/MIN/1.73M2 — LOW
EGFR: 19 ML/MIN/1.73M2 — LOW
EGFR: 24 ML/MIN/1.73M2 — LOW
EOSINOPHIL # BLD AUTO: 0.05 K/UL — SIGNIFICANT CHANGE UP (ref 0–0.7)
EOSINOPHIL NFR BLD AUTO: 0.5 % — SIGNIFICANT CHANGE UP (ref 0–8)
GAS PNL BLDA: SIGNIFICANT CHANGE UP
GAS PNL BLDA: SIGNIFICANT CHANGE UP
GLUCOSE SERPL-MCNC: 225 MG/DL — HIGH (ref 70–99)
GLUCOSE SERPL-MCNC: 231 MG/DL — HIGH (ref 70–99)
GLUCOSE SERPL-MCNC: 272 MG/DL — HIGH (ref 70–99)
HCT VFR BLD CALC: 37.2 % — LOW (ref 42–52)
HCT VFR BLD CALC: 37.4 % — LOW (ref 42–52)
HGB BLD-MCNC: 12.2 G/DL — LOW (ref 14–18)
HGB BLD-MCNC: 12.5 G/DL — LOW (ref 14–18)
IMM GRANULOCYTES NFR BLD AUTO: 1.6 % — HIGH (ref 0.1–0.3)
INR BLD: 0.98 RATIO — SIGNIFICANT CHANGE UP (ref 0.65–1.3)
INR BLD: 0.99 RATIO — SIGNIFICANT CHANGE UP (ref 0.65–1.3)
LACTATE SERPL-SCNC: 1.6 MMOL/L — SIGNIFICANT CHANGE UP (ref 0.7–2)
LYMPHOCYTES # BLD AUTO: 1.61 K/UL — SIGNIFICANT CHANGE UP (ref 1.2–3.4)
LYMPHOCYTES # BLD AUTO: 14.9 % — LOW (ref 20.5–51.1)
MAGNESIUM SERPL-MCNC: 2.4 MG/DL — SIGNIFICANT CHANGE UP (ref 1.8–2.4)
MCHC RBC-ENTMCNC: 31.5 PG — HIGH (ref 27–31)
MCHC RBC-ENTMCNC: 31.6 PG — HIGH (ref 27–31)
MCHC RBC-ENTMCNC: 32.8 G/DL — SIGNIFICANT CHANGE UP (ref 32–37)
MCHC RBC-ENTMCNC: 33.4 G/DL — SIGNIFICANT CHANGE UP (ref 32–37)
MCV RBC AUTO: 94.2 FL — HIGH (ref 80–94)
MCV RBC AUTO: 96.4 FL — HIGH (ref 80–94)
MONOCYTES # BLD AUTO: 0.59 K/UL — SIGNIFICANT CHANGE UP (ref 0.1–0.6)
MONOCYTES NFR BLD AUTO: 5.4 % — SIGNIFICANT CHANGE UP (ref 1.7–9.3)
NEUTROPHILS # BLD AUTO: 8.37 K/UL — HIGH (ref 1.4–6.5)
NEUTROPHILS NFR BLD AUTO: 77.2 % — HIGH (ref 42.2–75.2)
NRBC # BLD: 0 /100 WBCS — SIGNIFICANT CHANGE UP (ref 0–0)
NRBC # BLD: 0 /100 WBCS — SIGNIFICANT CHANGE UP (ref 0–0)
PLATELET # BLD AUTO: 153 K/UL — SIGNIFICANT CHANGE UP (ref 130–400)
PLATELET # BLD AUTO: 201 K/UL — SIGNIFICANT CHANGE UP (ref 130–400)
PMV BLD: 11.1 FL — HIGH (ref 7.4–10.4)
PMV BLD: 11.4 FL — HIGH (ref 7.4–10.4)
POTASSIUM SERPL-MCNC: 3.7 MMOL/L — SIGNIFICANT CHANGE UP (ref 3.5–5)
POTASSIUM SERPL-MCNC: 4 MMOL/L — SIGNIFICANT CHANGE UP (ref 3.5–5)
POTASSIUM SERPL-MCNC: 5 MMOL/L — SIGNIFICANT CHANGE UP (ref 3.5–5)
POTASSIUM SERPL-SCNC: 3.7 MMOL/L — SIGNIFICANT CHANGE UP (ref 3.5–5)
POTASSIUM SERPL-SCNC: 4 MMOL/L — SIGNIFICANT CHANGE UP (ref 3.5–5)
POTASSIUM SERPL-SCNC: 5 MMOL/L — SIGNIFICANT CHANGE UP (ref 3.5–5)
PROT SERPL-MCNC: 6.1 G/DL — SIGNIFICANT CHANGE UP (ref 6–8)
PROT SERPL-MCNC: 6.6 G/DL — SIGNIFICANT CHANGE UP (ref 6–8)
PROTHROM AB SERPL-ACNC: 11.2 SEC — SIGNIFICANT CHANGE UP (ref 9.95–12.87)
PROTHROM AB SERPL-ACNC: 11.3 SEC — SIGNIFICANT CHANGE UP (ref 9.95–12.87)
RBC # BLD: 3.86 M/UL — LOW (ref 4.7–6.1)
RBC # BLD: 3.97 M/UL — LOW (ref 4.7–6.1)
RBC # FLD: 13.1 % — SIGNIFICANT CHANGE UP (ref 11.5–14.5)
RBC # FLD: 13.4 % — SIGNIFICANT CHANGE UP (ref 11.5–14.5)
SODIUM SERPL-SCNC: 132 MMOL/L — LOW (ref 135–146)
SODIUM SERPL-SCNC: 134 MMOL/L — LOW (ref 135–146)
SODIUM SERPL-SCNC: 136 MMOL/L — SIGNIFICANT CHANGE UP (ref 135–146)
TROPONIN T, HIGH SENSITIVITY RESULT: 202 NG/L — CRITICAL HIGH (ref 6–21)
TROPONIN T, HIGH SENSITIVITY RESULT: 249 NG/L — CRITICAL HIGH (ref 6–21)
WBC # BLD: 10.83 K/UL — HIGH (ref 4.8–10.8)
WBC # BLD: 16.52 K/UL — HIGH (ref 4.8–10.8)
WBC # FLD AUTO: 10.83 K/UL — HIGH (ref 4.8–10.8)
WBC # FLD AUTO: 16.52 K/UL — HIGH (ref 4.8–10.8)

## 2024-08-30 PROCEDURE — 85027 COMPLETE CBC AUTOMATED: CPT

## 2024-08-30 PROCEDURE — 36415 COLL VENOUS BLD VENIPUNCTURE: CPT

## 2024-08-30 PROCEDURE — 92610 EVALUATE SWALLOWING FUNCTION: CPT | Mod: GN

## 2024-08-30 PROCEDURE — 85610 PROTHROMBIN TIME: CPT

## 2024-08-30 PROCEDURE — 99291 CRITICAL CARE FIRST HOUR: CPT

## 2024-08-30 PROCEDURE — 76770 US EXAM ABDO BACK WALL COMP: CPT

## 2024-08-30 PROCEDURE — 99223 1ST HOSP IP/OBS HIGH 75: CPT

## 2024-08-30 PROCEDURE — 84295 ASSAY OF SERUM SODIUM: CPT

## 2024-08-30 PROCEDURE — 80053 COMPREHEN METABOLIC PANEL: CPT

## 2024-08-30 PROCEDURE — 85025 COMPLETE CBC W/AUTO DIFF WBC: CPT

## 2024-08-30 PROCEDURE — 82330 ASSAY OF CALCIUM: CPT

## 2024-08-30 PROCEDURE — 93010 ELECTROCARDIOGRAM REPORT: CPT

## 2024-08-30 PROCEDURE — 70450 CT HEAD/BRAIN W/O DYE: CPT | Mod: MC

## 2024-08-30 PROCEDURE — 93005 ELECTROCARDIOGRAM TRACING: CPT

## 2024-08-30 PROCEDURE — 99292 CRITICAL CARE ADDL 30 MIN: CPT

## 2024-08-30 PROCEDURE — 93307 TTE W/O DOPPLER COMPLETE: CPT

## 2024-08-30 PROCEDURE — 84443 ASSAY THYROID STIM HORMONE: CPT

## 2024-08-30 PROCEDURE — 71045 X-RAY EXAM CHEST 1 VIEW: CPT

## 2024-08-30 PROCEDURE — 84132 ASSAY OF SERUM POTASSIUM: CPT

## 2024-08-30 PROCEDURE — 71045 X-RAY EXAM CHEST 1 VIEW: CPT | Mod: 26

## 2024-08-30 PROCEDURE — 85730 THROMBOPLASTIN TIME PARTIAL: CPT

## 2024-08-30 PROCEDURE — 97162 PT EVAL MOD COMPLEX 30 MIN: CPT | Mod: GP

## 2024-08-30 PROCEDURE — 87641 MR-STAPH DNA AMP PROBE: CPT

## 2024-08-30 PROCEDURE — 70450 CT HEAD/BRAIN W/O DYE: CPT | Mod: 26

## 2024-08-30 PROCEDURE — 80048 BASIC METABOLIC PNL TOTAL CA: CPT

## 2024-08-30 PROCEDURE — 83735 ASSAY OF MAGNESIUM: CPT

## 2024-08-30 PROCEDURE — 82010 KETONE BODYS QUAN: CPT

## 2024-08-30 PROCEDURE — 87640 STAPH A DNA AMP PROBE: CPT

## 2024-08-30 PROCEDURE — 0225U NFCT DS DNA&RNA 21 SARSCOV2: CPT

## 2024-08-30 PROCEDURE — 71045 X-RAY EXAM CHEST 1 VIEW: CPT | Mod: 26,77

## 2024-08-30 PROCEDURE — 83605 ASSAY OF LACTIC ACID: CPT

## 2024-08-30 PROCEDURE — 97167 OT EVAL HIGH COMPLEX 60 MIN: CPT | Mod: GO

## 2024-08-30 PROCEDURE — 84100 ASSAY OF PHOSPHORUS: CPT

## 2024-08-30 PROCEDURE — 81001 URINALYSIS AUTO W/SCOPE: CPT

## 2024-08-30 PROCEDURE — 87086 URINE CULTURE/COLONY COUNT: CPT

## 2024-08-30 PROCEDURE — 82803 BLOOD GASES ANY COMBINATION: CPT

## 2024-08-30 PROCEDURE — 82962 GLUCOSE BLOOD TEST: CPT

## 2024-08-30 RX ORDER — HEPARIN SODIUM,BOVINE 1000/ML
VIAL (ML) INJECTION
Qty: 25000 | Refills: 0 | Status: DISCONTINUED | OUTPATIENT
Start: 2024-08-30 | End: 2024-08-31

## 2024-08-30 RX ORDER — SODIUM BICARBONATE 84 MG/ML
650 INJECTION, SOLUTION INTRAVENOUS EVERY 8 HOURS
Refills: 0 | Status: DISCONTINUED | OUTPATIENT
Start: 2024-08-30 | End: 2024-09-04

## 2024-08-30 RX ORDER — CALCIUM CARBONATE/VITAMIN D3 500MG-5MCG
1 TABLET ORAL
Refills: 0 | DISCHARGE

## 2024-08-30 RX ORDER — HEPARIN SODIUM,BOVINE 1000/ML
6000 VIAL (ML) INJECTION EVERY 6 HOURS
Refills: 0 | Status: DISCONTINUED | OUTPATIENT
Start: 2024-08-30 | End: 2024-08-31

## 2024-08-30 RX ORDER — GLUCAGON INJECTION, SOLUTION 1 MG/.2ML
2 INJECTION, SOLUTION SUBCUTANEOUS ONCE
Refills: 0 | Status: COMPLETED | OUTPATIENT
Start: 2024-08-30 | End: 2024-08-30

## 2024-08-30 RX ORDER — DOPAMINE HCL 40 MG/ML
2 AMPUL (ML) INTRAVENOUS
Qty: 400 | Refills: 0 | Status: DISCONTINUED | OUTPATIENT
Start: 2024-08-30 | End: 2024-08-31

## 2024-08-30 RX ORDER — FINASTERIDE 1 MG/1
5 TABLET, FILM COATED ORAL DAILY
Refills: 0 | Status: DISCONTINUED | OUTPATIENT
Start: 2024-08-30 | End: 2024-09-04

## 2024-08-30 RX ORDER — ROPIVACAINE IN 0.9% SOD CHL/PF 0.1 %
0.05 PLASTIC BAG, INJECTION (ML) EPIDURAL
Qty: 8 | Refills: 0 | Status: DISCONTINUED | OUTPATIENT
Start: 2024-08-30 | End: 2024-08-31

## 2024-08-30 RX ORDER — ASPIRIN 81 MG
81 TABLET, DELAYED RELEASE (ENTERIC COATED) ORAL DAILY
Refills: 0 | Status: DISCONTINUED | OUTPATIENT
Start: 2024-08-30 | End: 2024-09-04

## 2024-08-30 RX ORDER — ATROPINE SULFATE MONOHYDRATE 1 G/G
1 POWDER MISCELLANEOUS ONCE
Refills: 0 | Status: COMPLETED | OUTPATIENT
Start: 2024-08-30 | End: 2024-08-30

## 2024-08-30 RX ORDER — ONDANSETRON 2 MG/ML
4 INJECTION, SOLUTION INTRAMUSCULAR; INTRAVENOUS ONCE
Refills: 0 | Status: COMPLETED | OUTPATIENT
Start: 2024-08-30 | End: 2024-08-30

## 2024-08-30 RX ORDER — SODIUM BICARBONATE 1 MEQ/ML
650 SYRINGE (ML) INTRAVENOUS
Qty: 0 | Refills: 0 | DISCHARGE

## 2024-08-30 RX ORDER — DAPAGLIFLOZIN 5 MG/1
1 TABLET, FILM COATED ORAL
Qty: 0 | Refills: 0 | DISCHARGE

## 2024-08-30 RX ORDER — HEPARIN SODIUM,BOVINE 1000/ML
3000 VIAL (ML) INJECTION EVERY 6 HOURS
Refills: 0 | Status: DISCONTINUED | OUTPATIENT
Start: 2024-08-30 | End: 2024-08-31

## 2024-08-30 RX ORDER — PANTOPRAZOLE SODIUM 40 MG
40 TABLET, DELAYED RELEASE (ENTERIC COATED) ORAL
Refills: 0 | Status: DISCONTINUED | OUTPATIENT
Start: 2024-08-30 | End: 2024-09-04

## 2024-08-30 RX ADMIN — ONDANSETRON 4 MILLIGRAM(S): 2 INJECTION, SOLUTION INTRAMUSCULAR; INTRAVENOUS at 12:52

## 2024-08-30 RX ADMIN — Medication 1300 UNIT(S)/HR: at 20:37

## 2024-08-30 RX ADMIN — ATROPINE SULFATE MONOHYDRATE 1 MILLIGRAM(S): 1 POWDER at 12:00

## 2024-08-30 RX ADMIN — Medication 5.55 MICROGRAM(S)/KG/MIN: at 12:56

## 2024-08-30 RX ADMIN — GLUCAGON INJECTION, SOLUTION 2 MILLIGRAM(S): 1 INJECTION, SOLUTION SUBCUTANEOUS at 12:52

## 2024-08-30 RX ADMIN — Medication 6.94 MICROGRAM(S)/KG/MIN: at 12:56

## 2024-08-30 NOTE — ED PROVIDER NOTE - NSICDXPASTMEDICALHX_GEN_ALL_CORE_FT
PAST MEDICAL HISTORY:  Benign essential HTN     BPH (benign prostatic hyperplasia)     Chronic hepatitis B     Chronic kidney disease, unspecified CKD stage     H/O abdominal aortic aneurysm     HLD (hyperlipidemia)     Paroxysmal atrial fibrillation     Severe aortic stenosis     Tricuspid regurgitation

## 2024-08-30 NOTE — H&P ADULT - HISTORY OF PRESENT ILLNESS
89M, Cantonese speaking, with PMHx:  - pAF (on Eliquis),   - HTN,   - HLD,   - CKD Stage 3b (unclear baseline),   - thoracic aortic ectasia,   - BPH,   - vitamin D deficiency,   - chronic hepatitis B,   - CAD and severe low flow, low gradient AS: s/p Left and right Heart cath on 8/23/24: p-mRCA 80-95% s/p rota and WOLF x2; dLM 30%; LAD 80%, mLAD 90%; D1 mod diffuse, D2 mod diffuse; LCx heavily calcified 80% diffuse; OM2 anio FFR 0.72. s/p RHC 8/23/24: RA 13; RV 31/10; PA 38/23 (29); PCWP 19; CO / CI 3.13 / 1.8; AoSat 98/8%; PaSat 68.2%; dopamine 1mcg / kg / min for AV valve studypeak to peak ratio 10.4mmHg; mean 13.75mmHg; MATT 0.91cm; SV 22.63.   - TTE 8/2/24: EF 55-60% mild concentric LVH; no RWMA; LA severely dilated; severe AS; mean 19.7; MATT 0.7; V max 2.95    Presents to the emergency department for bradycardia and hypotension reported by EMS.  Family called EMS.  Heart rate in the 30s with unobtainable blood pressure upon arrival.  Given 1 of atropine and 250 cc bolus with some improvement.  He was transcutaneously paced en route.   In the Ed his HR was in the 30s -40s;   Given that he didn't improve after atropine, he was started on dopamine and levophed; and was given glucagon.     At home he is on coreg 6.25 BID and Cardizem 180.    89M, Cantonese speaking, with PMHx:  - persistent AF (on Eliquis),   - HTN,   - HLD,   - CKD Stage 3b (unclear baseline),   - thoracic aortic ectasia,   - BPH,   - vitamin D deficiency,   - chronic hepatitis B,   - CAD and severe low flow, low gradient AS:   s/p Left and right Heart cath on 8/23/24: p-mRCA 80-95% s/p rota and WOLF x2; dLM 30%; LAD 80%, mLAD 90%; D1 mod diffuse, D2 mod diffuse; LCx heavily calcified 80% diffuse; OM2 anio FFR 0.72. s/p RHC 8/23/24: RA 13; RV 31/10; PA 38/23 (29); PCWP 19; CO / CI 3.13 / 1.8; AoSat 98/8%; PaSat 68.2%; dopamine 1mcg / kg / min for AV valve studypeak to peak ratio 10.4mmHg; mean 13.75mmHg; AMTT 0.91cm; SV 22.63.   - TTE 8/2/24: EF 55-60% mild concentric LVH; no RWMA; LA severely dilated; severe AS; mean 19.7; MATT 0.7; V max 2.95    Patient woke up today very lethargic and weak .  Family called EMS; on their arrival, he was bradycardic to the 30s and with unobtainable blood pressure upon arrival.   Given 1 of atropine and 250 cc bolus with some improvement.    He was transcutaneously paced en route.   on arrival to the emergency department he was bradycardic and hypotensive.   Given that he didn't improve after atropine, he was started on dopamine and levophed; and was given glucagon.     Patient was recently discharged on coreg 6.25 BID and Cardizem 180, however on Wednesday, meds were adjusted by his cardiologist in [place] Dr Scott to carvedilol 25 mg BID and Nifedipine 90 mg XL OD   Also hydralazine 10 mg and valsartan 40 mg that he was discharged on were switched to losartan 100 mg OD

## 2024-08-30 NOTE — H&P ADULT - ASSESSMENT
CAD s/p cath on 8/23/24 s/p DESx2 to the p mRCA and was planed for staged PCI to the LAD/Lcx in 4 to 6 weeks;   8/23/24: low flow low gradient AS (MATT 0.91 cm2: SV: 22.63).   RHC (RA 13; RV 31/10; PA: 38/23 (29). DAWP 19) CO/CI: 3.13/1.8  TTE 8/2/24: EF 55-60% mild concentric LVH; no RWMA; LA severely dilated; severe AS; mean 19.7; MATT 0.7; V max 2.95    A fib on eliquis, HTN, severe AS, HTN, HLD, CKD 3b, ectasia of thoracic aorta, BPH, chronic hepatitis B    Presenting for chest pain and dyspnea on exertion.     In the Ed his HR was in the 30s -40s; didn't improve after atropine so he was started on dopamine and levophed.     At home he is on coreg 6.25 BID and Cardizem 180.       IMPRESSION:    PLAN:    CNS:   - Avoid CNS Depressants     HEENT:   - Oral care.   - Aspiration precautions     PULMONARY:   - HOB @ 45.   - Monitor Pulse Ox. Keep > 93%. Supplement as needed.    CARDIOVASCULAR:   - Daily Weight.   - Strict I&Os. Keep I < O    GI:   - GI prophylaxis.   - Diet: DASH/TLC, carb consistent diet    RENAL:   - Monitor BMP/Cr  - Avoid nephrotoxic agents     INFECTIOUS DISEASE:   - Monitor WBC  - Trend fever    HEMATOLOGICAL:   - Monitor H&H, Keep active T&S  - DVT prophylaxis:     ENDOCRINE:   - Monitor FS  - Sliding scale, Basal/Bolus regimen    MUSCULOSKELETAL:   - Ambulate as tolerated     CODE STATUS:    DISPOSITION:    89 y.o M with PMH of CAD severe AS and Afib; admitted for symptomatic unstable bradycardia.     #Symptomatic unstable Afib with Slow VR   #baseline paroxysmal AF   #CAD s/p [facility] 8/23/24: p-mRCA 80-95% s/p rota and WOLF x2; and planned for staged PCI of LAD / LCx   #severe low flow, low gradient AS  #HTN  #ALESSANDRA on CKD Stage 3b (unclear baseline)    IMPRESSION:    PLAN:    CNS:   - Avoid CNS Depressants     HEENT:   - Oral care.   - Aspiration precautions     PULMONARY:   - HOB @ 45.   - Monitor Pulse Ox. Keep > 93%. Supplement as needed.    CARDIOVASCULAR:   - trop 249 -> 202   - ECG: a fib with SVR   - Daily Weight.   - Strict I&Os. Keep I < O  - s/p atropine and glucagon   - c/w dopamine and levophed   - NPO after midnight in case a PM will be placed    GI:   - GI prophylaxis.   - Diet: DASH/TLC    RENAL:   - Monitor BMP/Cr  - Avoid nephrotoxic agents   - ALESSANDRA -> likely from hypotension (f up urine studies and US kidney/bladder)  - start sodium bicarb 350 TID to target bicarb 22; (now it is 14)  - f up repeat BMP ( to replete K+ and calcium)     INFECTIOUS DISEASE:   - Monitor WBC  - no signs or symptoms of infection    HEMATOLOGICAL:   - Monitor H&H, Keep active T&S  - DVT prophylaxis    ENDOCRINE:   - recent A1c within normal limits     MUSCULOSKELETAL:   - increase as tolerated     CODE STATUS: full    DISPOSITION: CCU   89 y.o M with PMH of CAD severe AS and Afib; admitted for symptomatic unstable bradycardia.     #Symptomatic unstable Afib with Slow VR   #baseline persistent AF   #CAD s/p [facility] 8/23/24: p-mRCA 80-95% s/p rota and WOLF x2; and planned for staged PCI of LAD / LCx   #severe low flow, low gradient AS  #HTN  #ALESSANDRA on CKD Stage 3b (unclear baseline)    IMPRESSION:    PLAN:    CNS:   - Avoid CNS Depressants   - at baseline patient is alert oriented x1; currently he is alert but drowsy; non focal neuro exam   - CT head   - ABG to r/o Co2 retention    HEENT:   - Oral care.   - Aspiration precautions     PULMONARY:   - HOB @ 45.   - Monitor Pulse Ox. Keep > 93%. Supplement as needed.    CARDIOVASCULAR:   - trop 249 -> 202   - ECG: a fib with SVR   - Daily Weight.   - Strict I&Os. Keep I < O  - s/p atropine and glucagon   - Responding to vasopressors: c/w dopamine and levophed. Wean of pressors gradually. Try to wean off the levophed first. EP recs appreciated: If successfully weaned off, can restart coreg at lower dose considering severe CAD. No need for temp pacemaker at this moment.  - hold carvedilol, nifedipine, hydralazine, losartan, tamsulosin, farxiga,   - c/w aspirin, clopidogrel and statin for CAD  - switch eliquis to heparin drip in case any procedure will be performed  - NPO after midnight in case a PM will be placed  - f up TSH      GI:   - GI prophylaxis.   - S&S eval; keep NPO for now except meds (should not skip aspirin and plavix given the recent stents) to prevent aspiration; unless improvement in LOC. if patient not able to swallow the pills -> will need NG tube placement.     RENAL:   - Monitor BMP/Cr  - Avoid nephrotoxic agents   - ALESSANDRA -> likely from hypotension (f up urine studies and US kidney/bladder)  - start sodium bicarb 650 TID to target bicarb 22; (now it is 14)  - f up repeat BMP ( to replete K+ and calcium)     INFECTIOUS DISEASE:   - Monitor WBC  - no signs or symptoms of infection    HEMATOLOGICAL:   - Monitor H&H, Keep active T&S  - heparin drip instead of eliquis    ENDOCRINE:   - recent A1c within normal limits   - f up TSH    MUSCULOSKELETAL:   - increase as tolerated   - hematoma at the level of the left thigh -> monitor to assess improvement    Lines: Right IJ central line; Left radial A line  CODE STATUS: full    DISPOSITION: CCU

## 2024-08-30 NOTE — ED ADULT NURSE NOTE - NSFALLHARMRISKINTERV_ED_ALL_ED

## 2024-08-30 NOTE — ED ADULT NURSE NOTE - NSHOSCREENINGQ1_ED_ALL_ED
Condition:: Cosmetic Please Describe Your Condition:: Patient presents for a silk peel for skin maintenance. Patient c/o congestion in the chin area and wishes to discuss light chem peels or a peel focused on chin area. Patient is using Atralin QHS. denies having any additional concerns. Medications, allergies, and medical hx were reviewed. No

## 2024-08-30 NOTE — PROCEDURAL SAFETY CHECKLIST WITH OR WITHOUT SEDATION - NSCNFIRMBLDLOSS_GEN_ALL_CORE
RN went to recheck pt's temperature after being treated with motrin, parent at the bedside stated child is sleeping "I will check at home, I don't want to wake him up"  Provider made aware       Aydee Ferris RN  05/27/22 [address], RN  05/27/22 5516 done

## 2024-08-30 NOTE — CONSULT NOTE ADULT - ASSESSMENT
Persistent Af  Severe CAD s/p PCI  Severe AS  Bradycardia    Responding to vasopressors  CCU  Wean of pressors gradually. If successfully weaned off, can restart coreg at lower dose considering severe CAD. No need for temp pacemaker at this moment.  If no improvement in HR or need for BB, will benefit from Pacemaker- single chamber leadless.- to be decided  TSH  Echo if not done recently  OAC if no contraindication- primary team to assess.  Will continue to f-up  Discussed with CCU team, family at bedside.

## 2024-08-30 NOTE — ED PROVIDER NOTE - CLINICAL SUMMARY MEDICAL DECISION MAKING FREE TEXT BOX
89-year-old male, with past medical history of HLD, atrial fibrillation, CKD 3, AAA, chronic hep B, severe AS, presents to the emergency department for  bradycardia causing sob and lethargica. pt was found to be a-fib slow ventricular rate, was transcutaneous pacing. started on dobutamine and norepinephrine and BP stabilized and admitted to ccu.

## 2024-08-30 NOTE — ED PROVIDER NOTE - CARE PLAN
Principal Discharge DX:	Atrial fibrillation with slow ventricular response  Secondary Diagnosis:	Symptomatic bradycardia   1

## 2024-08-30 NOTE — PROCEDURAL SAFETY CHECKLIST WITH OR WITHOUT SEDATION - NSPRESITESIDESED_GEN_ALL_CORE
----- Message from Sanya Lawson MA sent at 5/8/2023 11:13 AM CDT -----   Good Morning     Pt is needing her appt rescheduled,it can be scheduled by the end of the month.     Thank You   Sanya    
Left message on voicemail  
done...
done...

## 2024-08-30 NOTE — ED ADULT NURSE NOTE - OBJECTIVE STATEMENT
Patient is a 89 year old male BIBA for bradycardia and hypotension- patient currently being transcutaneously paced at 80BPM output 55.

## 2024-08-30 NOTE — CONSULT NOTE ADULT - SUBJECTIVE AND OBJECTIVE BOX
Patient is a 89y old  Male who presents with a chief complaint of       HPI:    89, M, CAD/PCI, Severe low grad AS, HTN, DL, CKD-3, Persistent AF, BPH, Chr Hep B is here for lethargy- found to have ramila cardia with no BP by EMS- didnt respond with atropine, but responding to vasopressor. Currently, drapped via putting central line.       PAST MEDICAL & SURGICAL HISTORY:  Benign essential HTN      HLD (hyperlipidemia)      Chronic kidney disease, unspecified CKD stage      H/O abdominal aortic aneurysm      Severe aortic stenosis      BPH (benign prostatic hyperplasia)      Chronic hepatitis B      Tricuspid regurgitation      Paroxysmal atrial fibrillation      H/O hemorrhoidectomy                      PREVIOUS DIAGNOSTIC TESTING:      ECHO  FINDINGS:    STRESS  FINDINGS:    CATHETERIZATION  FINDINGS:    ELECTROPHYSIOLOGY STUDY  FINDINGS:    CAROTID ULTRASOUND:  FINDINGS    VENOUS DUPLEX SCAN:  FINDINGS:    CHEST CT PULMONARY ANGIO with IV Contrast:  FINDINGS:    MEDICATIONS  (STANDING):  DOPamine Infusion 2 MICROgram(s)/kG/Min (5.55 mL/Hr) IV Continuous <Continuous>  norepinephrine Infusion 0.05 MICROgram(s)/kG/Min (6.94 mL/Hr) IV Continuous <Continuous>  pantoprazole    Tablet 40 milliGRAM(s) Oral before breakfast  sodium bicarbonate 650 milliGRAM(s) Oral every 8 hours    MEDICATIONS  (PRN):      FAMILY HISTORY:      SOCIAL HISTORY:    CIGARETTES:    ALCOHOL:    Past Surgical History:    Allergies:    No Known Allergies      REVIEW OF SYSTEMS:    As Above.    Vital Signs Last 24 Hrs  T(C): 35.9 (30 Aug 2024 16:00), Max: 36.2 (30 Aug 2024 11:50)  T(F): 96.7 (30 Aug 2024 16:00), Max: 97.2 (30 Aug 2024 11:50)  HR: 96 (30 Aug 2024 18:03) (39 - 96)  BP: 89/51 (30 Aug 2024 13:14) (74/51 - 91/62)  BP(mean): 64 (30 Aug 2024 13:14) (61 - 72)  RR: 20 (30 Aug 2024 18:03) (17 - 22)  SpO2: 99% (30 Aug 2024 18:03) (95% - 100%)    Parameters below as of 30 Aug 2024 18:03  Patient On (Oxygen Delivery Method): nasal cannula  O2 Flow (L/min): 4      PHYSICAL EXAM:        Deferred examination      INTERPRETATION OF TELEMETRY:    ECG:    I&O's Detail      LABS:                        12.2   10.83 )-----------( 153      ( 30 Aug 2024 12:40 )             37.2     08-30    136  |  109  |  32<H>  ----------------------------<  272<H>  3.7   |  14<L>  |  2.5<H>    Ca    6.3<L>      30 Aug 2024 14:52    TPro  6.1  /  Alb  3.6  /  TBili  1.7<H>  /  DBili  x   /  AST  99<H>  /  ALT  70<H>  /  AlkPhos  113  08-30        PT/INR - ( 30 Aug 2024 12:40 )   PT: 11.30 sec;   INR: 0.99 ratio         PTT - ( 30 Aug 2024 12:40 )  PTT:31.7 sec  Urinalysis Basic - ( 30 Aug 2024 14:52 )    Color: x / Appearance: x / SG: x / pH: x  Gluc: 272 mg/dL / Ketone: x  / Bili: x / Urobili: x   Blood: x / Protein: x / Nitrite: x   Leuk Esterase: x / RBC: x / WBC x   Sq Epi: x / Non Sq Epi: x / Bacteria: x      BNP  I&O's Detail    Daily Height in cm: 170.18 (30 Aug 2024 12:06)    Daily     RADIOLOGY & ADDITIONAL STUDIES:

## 2024-08-30 NOTE — ED PROVIDER NOTE - PHYSICAL EXAMINATION
GENERAL: Well-developed; well-nourished; in acute respiratory distress   SKIN: warm, dry  HEAD: Normocephalic; atraumatic.  EYES: 2mm pupils, PERRLA, EOMI, no conjunctival erythema  ENT: No nasal discharge; airway clear.  NECK: Supple; non tender.  CARD: Paced rhythm. 2/4 radial, PT and DP pulses   RESP: Occasional agonal breathing. LCTAB; No wheezes, rales, rhonchi, or stridor.  ABD: soft, nontender, and nondistended  EXT: Normal ROM.  No LE TTP or edema bilaterally.  NEURO: Awake, does not follow commands

## 2024-08-30 NOTE — H&P ADULT - NSHPPHYSICALEXAM_GEN_ALL_CORE
LOS:     VITALS:   T(C): 35.9 (08-30-24 @ 16:00), Max: 36.2 (08-30-24 @ 11:50)  HR: 96 (08-30-24 @ 18:03) (39 - 96)  BP: 89/51 (08-30-24 @ 13:14) (74/51 - 91/62)  RR: 20 (08-30-24 @ 18:03) (17 - 22)  SpO2: 99% (08-30-24 @ 18:03) (95% - 100%)    GENERAL: NAD, lying in bed comfortably  HEAD:  Atraumatic, Normocephalic  EYES: EOMI, PERRLA, conjunctiva and sclera clear  ENT: Moist mucous membranes  NECK: Supple, No JVD  CHEST/LUNG: Clear to auscultation bilaterally; No rales, rhonchi, wheezing, or rubs. Unlabored respirations  HEART: irregular rate and rhythm; systolic murmur at the site of Aortic valve  ABDOMEN: BSx4; Soft, nontender, nondistended  EXTREMITIES:  No edema  NERVOUS SYSTEM:  A&Ox1, no focal deficits, sleepy   SKIN: No rashes or lesions; hematoma at the level of the left thigh

## 2024-08-30 NOTE — ED PROVIDER NOTE - ATTENDING CONTRIBUTION TO CARE
Attending Statement: I have personally provided the amount of critical care time documented below excluding time spent on separate procedures.     Critical Care Time Spent (min) Must be 30 or more minutes to qualify: 75.  89M, former smoker, Cantonese speaking, w/ PMHx of pAF (on Eliquis, last dose 8/20 PM), severe AS, HTN, HLD, CKD Stage 3b (unclear baseline), thoracic aortic ectasia, BPH, vitamin D deficiency, and chronic hepatitis B, s/p cath 08/23 discharged 08/24 BIBEMS due to feeling sob and dizzy.   pt is Cantonese speaker however with  cannot get history.  pt is bradycardic to high 30s low 40s hypotensive to 80s/50 being paced transcutaneously by ems, one dose of atropin given by ems without response.  pt placed in trauma bay, 2 iv established ecg shows a-fib with slow repose (40s)   will resuscitate with levo and dopamine considering TVP consulted cardiology fellow.   pt is cool and clammy, lungs clear and appear confused.  pt has ecchymosis on groin area endorses pain there appears left femoral artery angio not expanding.   will send labs monitor if not responding to medical management will do TVP.

## 2024-08-30 NOTE — ED ADULT TRIAGE NOTE - CHIEF COMPLAINT QUOTE
Patient BIBA for bradycardia and hypotension- patient received 1mg of atropine en route with no response- patient currently getting transcutaneously paced.

## 2024-08-30 NOTE — ED PROVIDER NOTE - WR INTERPRETATION 1
CXR negative - No pneumothorax, No opacities, No free air Transcutaneous pacemaker left side of the chest

## 2024-08-30 NOTE — ED PROVIDER NOTE - OBJECTIVE STATEMENT
89-year-old male, with past medical history of HLD, atrial fibrillation, CKD 3, AAA, chronic hep B, severe AS, presents to the emergency department for bradycardia and hypotension reported by EMS.  Family called EMS.  Heart rate in the 30s with unobtainable blood pressure upon arrival.  Given 1 of atropine and 250 cc bolus with some improvement.  He was transcutaneously paced en route.  Minimal history obtained by patient secondary to mental status.

## 2024-08-31 LAB
ALBUMIN SERPL ELPH-MCNC: 3.8 G/DL — SIGNIFICANT CHANGE UP (ref 3.5–5.2)
ALP SERPL-CCNC: 111 U/L — SIGNIFICANT CHANGE UP (ref 30–115)
ALT FLD-CCNC: 62 U/L — HIGH (ref 0–41)
ANION GAP SERPL CALC-SCNC: 18 MMOL/L — HIGH (ref 7–14)
APPEARANCE UR: ABNORMAL
APTT BLD: 107.4 SEC — CRITICAL HIGH (ref 27–39.2)
APTT BLD: 113.1 SEC — CRITICAL HIGH (ref 27–39.2)
APTT BLD: 119.2 SEC — CRITICAL HIGH (ref 27–39.2)
AST SERPL-CCNC: 46 U/L — HIGH (ref 0–41)
BASOPHILS # BLD AUTO: 0.03 K/UL — SIGNIFICANT CHANGE UP (ref 0–0.2)
BASOPHILS NFR BLD AUTO: 0.2 % — SIGNIFICANT CHANGE UP (ref 0–1)
BILIRUB SERPL-MCNC: 0.8 MG/DL — SIGNIFICANT CHANGE UP (ref 0.2–1.2)
BILIRUB UR-MCNC: NEGATIVE — SIGNIFICANT CHANGE UP
BUN SERPL-MCNC: 42 MG/DL — HIGH (ref 10–20)
CALCIUM SERPL-MCNC: 8 MG/DL — LOW (ref 8.4–10.5)
CHLORIDE SERPL-SCNC: 102 MMOL/L — SIGNIFICANT CHANGE UP (ref 98–110)
CO2 SERPL-SCNC: 14 MMOL/L — LOW (ref 17–32)
COLOR SPEC: SIGNIFICANT CHANGE UP
CREAT SERPL-MCNC: 3.1 MG/DL — HIGH (ref 0.7–1.5)
DIFF PNL FLD: NEGATIVE — SIGNIFICANT CHANGE UP
EGFR: 19 ML/MIN/1.73M2 — LOW
EOSINOPHIL # BLD AUTO: 0.01 K/UL — SIGNIFICANT CHANGE UP (ref 0–0.7)
EOSINOPHIL NFR BLD AUTO: 0.1 % — SIGNIFICANT CHANGE UP (ref 0–8)
GLUCOSE SERPL-MCNC: 185 MG/DL — HIGH (ref 70–99)
GLUCOSE UR QL: 100 MG/DL
HCT VFR BLD CALC: 35.4 % — LOW (ref 42–52)
HCT VFR BLD CALC: 35.4 % — LOW (ref 42–52)
HCT VFR BLD CALC: 36 % — LOW (ref 42–52)
HGB BLD-MCNC: 12 G/DL — LOW (ref 14–18)
HGB BLD-MCNC: 12.1 G/DL — LOW (ref 14–18)
HGB BLD-MCNC: 12.1 G/DL — LOW (ref 14–18)
IMM GRANULOCYTES NFR BLD AUTO: 0.5 % — HIGH (ref 0.1–0.3)
KETONES UR-MCNC: ABNORMAL MG/DL
LEUKOCYTE ESTERASE UR-ACNC: ABNORMAL
LYMPHOCYTES # BLD AUTO: 1.45 K/UL — SIGNIFICANT CHANGE UP (ref 1.2–3.4)
LYMPHOCYTES # BLD AUTO: 11.2 % — LOW (ref 20.5–51.1)
MAGNESIUM SERPL-MCNC: 2.2 MG/DL — SIGNIFICANT CHANGE UP (ref 1.8–2.4)
MCHC RBC-ENTMCNC: 31.4 PG — HIGH (ref 27–31)
MCHC RBC-ENTMCNC: 31.5 PG — HIGH (ref 27–31)
MCHC RBC-ENTMCNC: 31.9 PG — HIGH (ref 27–31)
MCHC RBC-ENTMCNC: 33.6 G/DL — SIGNIFICANT CHANGE UP (ref 32–37)
MCHC RBC-ENTMCNC: 33.9 G/DL — SIGNIFICANT CHANGE UP (ref 32–37)
MCHC RBC-ENTMCNC: 34.2 G/DL — SIGNIFICANT CHANGE UP (ref 32–37)
MCV RBC AUTO: 92.9 FL — SIGNIFICANT CHANGE UP (ref 80–94)
MCV RBC AUTO: 93.4 FL — SIGNIFICANT CHANGE UP (ref 80–94)
MCV RBC AUTO: 93.5 FL — SIGNIFICANT CHANGE UP (ref 80–94)
MONOCYTES # BLD AUTO: 1.54 K/UL — HIGH (ref 0.1–0.6)
MONOCYTES NFR BLD AUTO: 11.9 % — HIGH (ref 1.7–9.3)
MRSA PCR RESULT.: NEGATIVE — SIGNIFICANT CHANGE UP
MRSA PCR RESULT.: NEGATIVE — SIGNIFICANT CHANGE UP
NEUTROPHILS # BLD AUTO: 9.82 K/UL — HIGH (ref 1.4–6.5)
NEUTROPHILS NFR BLD AUTO: 76.1 % — HIGH (ref 42.2–75.2)
NITRITE UR-MCNC: NEGATIVE — SIGNIFICANT CHANGE UP
NRBC # BLD: 0 /100 WBCS — SIGNIFICANT CHANGE UP (ref 0–0)
PH UR: 5.5 — SIGNIFICANT CHANGE UP (ref 5–8)
PHOSPHATE SERPL-MCNC: 4.3 MG/DL — SIGNIFICANT CHANGE UP (ref 2.1–4.9)
PLATELET # BLD AUTO: 206 K/UL — SIGNIFICANT CHANGE UP (ref 130–400)
PLATELET # BLD AUTO: 206 K/UL — SIGNIFICANT CHANGE UP (ref 130–400)
PLATELET # BLD AUTO: 219 K/UL — SIGNIFICANT CHANGE UP (ref 130–400)
PMV BLD: 10.6 FL — HIGH (ref 7.4–10.4)
PMV BLD: 11.3 FL — HIGH (ref 7.4–10.4)
PMV BLD: 11.4 FL — HIGH (ref 7.4–10.4)
POTASSIUM SERPL-MCNC: 4.3 MMOL/L — SIGNIFICANT CHANGE UP (ref 3.5–5)
POTASSIUM SERPL-SCNC: 4.3 MMOL/L — SIGNIFICANT CHANGE UP (ref 3.5–5)
PROT SERPL-MCNC: 6.3 G/DL — SIGNIFICANT CHANGE UP (ref 6–8)
PROT UR-MCNC: 300 MG/DL
RAPID RVP RESULT: SIGNIFICANT CHANGE UP
RBC # BLD: 3.79 M/UL — LOW (ref 4.7–6.1)
RBC # BLD: 3.81 M/UL — LOW (ref 4.7–6.1)
RBC # BLD: 3.85 M/UL — LOW (ref 4.7–6.1)
RBC # FLD: 13.1 % — SIGNIFICANT CHANGE UP (ref 11.5–14.5)
RBC # FLD: 13.1 % — SIGNIFICANT CHANGE UP (ref 11.5–14.5)
RBC # FLD: 13.2 % — SIGNIFICANT CHANGE UP (ref 11.5–14.5)
SARS-COV-2 RNA SPEC QL NAA+PROBE: SIGNIFICANT CHANGE UP
SODIUM SERPL-SCNC: 134 MMOL/L — LOW (ref 135–146)
SP GR SPEC: 1.02 — SIGNIFICANT CHANGE UP (ref 1–1.03)
TSH SERPL-MCNC: 1.36 UIU/ML — SIGNIFICANT CHANGE UP (ref 0.27–4.2)
UROBILINOGEN FLD QL: 1 MG/DL — SIGNIFICANT CHANGE UP (ref 0.2–1)
WBC # BLD: 10.46 K/UL — SIGNIFICANT CHANGE UP (ref 4.8–10.8)
WBC # BLD: 12.91 K/UL — HIGH (ref 4.8–10.8)
WBC # BLD: 14.09 K/UL — HIGH (ref 4.8–10.8)
WBC # FLD AUTO: 10.46 K/UL — SIGNIFICANT CHANGE UP (ref 4.8–10.8)
WBC # FLD AUTO: 12.91 K/UL — HIGH (ref 4.8–10.8)
WBC # FLD AUTO: 14.09 K/UL — HIGH (ref 4.8–10.8)

## 2024-08-31 PROCEDURE — 93010 ELECTROCARDIOGRAM REPORT: CPT

## 2024-08-31 PROCEDURE — 76770 US EXAM ABDO BACK WALL COMP: CPT | Mod: 26

## 2024-08-31 PROCEDURE — 99291 CRITICAL CARE FIRST HOUR: CPT

## 2024-08-31 PROCEDURE — 93306 TTE W/DOPPLER COMPLETE: CPT | Mod: 26

## 2024-08-31 RX ORDER — HEPARIN SODIUM,BOVINE 1000/ML
6000 VIAL (ML) INJECTION EVERY 6 HOURS
Refills: 0 | Status: DISCONTINUED | OUTPATIENT
Start: 2024-08-31 | End: 2024-09-03

## 2024-08-31 RX ORDER — HEPARIN SODIUM,BOVINE 1000/ML
3000 VIAL (ML) INJECTION EVERY 6 HOURS
Refills: 0 | Status: DISCONTINUED | OUTPATIENT
Start: 2024-08-31 | End: 2024-09-03

## 2024-08-31 RX ORDER — DOPAMINE HCL 40 MG/ML
2.5 AMPUL (ML) INTRAVENOUS
Qty: 400 | Refills: 0 | Status: DISCONTINUED | OUTPATIENT
Start: 2024-08-31 | End: 2024-09-01

## 2024-08-31 RX ORDER — CHLORHEXIDINE GLUCONATE 40 MG/ML
1 SOLUTION TOPICAL DAILY
Refills: 0 | Status: DISCONTINUED | OUTPATIENT
Start: 2024-08-31 | End: 2024-09-04

## 2024-08-31 RX ORDER — HEPARIN SODIUM,BOVINE 1000/ML
900 VIAL (ML) INJECTION
Qty: 25000 | Refills: 0 | Status: DISCONTINUED | OUTPATIENT
Start: 2024-08-31 | End: 2024-09-03

## 2024-08-31 RX ORDER — ROPIVACAINE IN 0.9% SOD CHL/PF 0.1 %
0.05 PLASTIC BAG, INJECTION (ML) EPIDURAL
Qty: 16 | Refills: 0 | Status: DISCONTINUED | OUTPATIENT
Start: 2024-08-31 | End: 2024-09-01

## 2024-08-31 RX ADMIN — FINASTERIDE 5 MILLIGRAM(S): 1 TABLET, FILM COATED ORAL at 13:54

## 2024-08-31 RX ADMIN — Medication 1100 UNIT(S)/HR: at 02:54

## 2024-08-31 RX ADMIN — Medication 3.41 MICROGRAM(S)/KG/MIN: at 16:29

## 2024-08-31 RX ADMIN — Medication 5.55 MICROGRAM(S)/KG/MIN: at 02:15

## 2024-08-31 RX ADMIN — SODIUM BICARBONATE 650 MILLIGRAM(S): 84 INJECTION, SOLUTION INTRAVENOUS at 05:07

## 2024-08-31 RX ADMIN — SODIUM BICARBONATE 650 MILLIGRAM(S): 84 INJECTION, SOLUTION INTRAVENOUS at 13:54

## 2024-08-31 RX ADMIN — Medication 40 MILLIGRAM(S): at 06:24

## 2024-08-31 RX ADMIN — Medication 40 MILLIGRAM(S): at 21:13

## 2024-08-31 RX ADMIN — Medication 13.6 MICROGRAM(S)/KG/MIN: at 16:27

## 2024-08-31 RX ADMIN — Medication 6.94 MICROGRAM(S)/KG/MIN: at 05:07

## 2024-08-31 RX ADMIN — SODIUM BICARBONATE 650 MILLIGRAM(S): 84 INJECTION, SOLUTION INTRAVENOUS at 21:13

## 2024-08-31 RX ADMIN — Medication 3.41 MICROGRAM(S)/KG/MIN: at 10:19

## 2024-08-31 RX ADMIN — Medication 13.6 MICROGRAM(S)/KG/MIN: at 10:19

## 2024-08-31 RX ADMIN — Medication 6.94 MICROGRAM(S)/KG/MIN: at 01:15

## 2024-08-31 RX ADMIN — Medication 900 UNIT(S)/HR: at 10:19

## 2024-08-31 RX ADMIN — Medication 900 UNIT(S)/HR: at 16:28

## 2024-08-31 RX ADMIN — CHLORHEXIDINE GLUCONATE 1 APPLICATION(S): 40 SOLUTION TOPICAL at 13:56

## 2024-08-31 RX ADMIN — Medication 6.94 MICROGRAM(S)/KG/MIN: at 02:15

## 2024-08-31 RX ADMIN — Medication 81 MILLIGRAM(S): at 13:54

## 2024-08-31 RX ADMIN — Medication 75 MILLIGRAM(S): at 13:53

## 2024-08-31 RX ADMIN — Medication 700 UNIT(S)/HR: at 17:42

## 2024-08-31 NOTE — SWALLOW BEDSIDE ASSESSMENT ADULT - SWALLOW EVAL: CURRENT DIET
"[facility], [place]   Psychiatric Progress Note    Interim history   This is a 41 year old male with alcohol dependence, bipolar affective dx.Pt seen in rounds. Patient's mood is good  In 2003-2209 he was in correction , he was sober for the most part and he was feeling happy, he would talk fast, hears something , would go days  Without sleep and still have the energy.  He feels \"people are talking about me and whispering about me,\"  \"People are out of get me\"  Energy Level:alright   Sleep:poor   Appetite:normal motivation interest good   deneid any Suicidal/homicidal ideation/plan intent.  deneid any psychosis  No prior suicde attempts  No access to gun  Pt is in detox  Pt mssa/cows score are monitered  Tolerating meds and has no side effects.              Medications:     Current Facility-Administered Medications   Medication     lurasidone (LATUDA) tablet 20 mg     thiamine tablet 100 mg     QUEtiapine (SEROquel) tablet 25-50 mg     loperamide (IMODIUM) capsule 2 mg     divalproex (DEPAKOTE) EC tablet 500 mg     gabapentin (NEURONTIN) capsule 300 mg     mirtazapine (REMERON) tablet 15 mg     diazepam (VALIUM) tablet 5-20 mg     atenolol (TENORMIN) tablet 50 mg     folic acid (FOLVITE) tablet 1 mg     multivitamin, therapeutic with minerals (THERA-VIT-M) tablet 1 tablet     hydrOXYzine (ATARAX) tablet 25-50 mg     acetaminophen (TYLENOL) tablet 650 mg     alum & mag hydroxide-simethicone (MYLANTA ES/MAALOX  ES) suspension 30 mL     magnesium hydroxide (MILK OF MAGNESIA) suspension 30 mL     bisacodyl (DULCOLAX) Suppository 10 mg     nicotine polacrilex lozenge 4-8 mg             Allergies:   No Known Allergies         Psychiatric Examination:   Blood pressure 128/83, pulse 88, temperature 96.9  F (36.1  C), temperature source Oral, resp. rate 16, height 1.702 m (5' 7\"), weight 60.8 kg (134 lb), SpO2 98 %.  Weight is 134 lbs 0 oz  Body mass index is 20.99 kg/(m^2).    Appearance:  awake, " "alert and adequately groomed  Attitude:  cooperative  Eye Contact:  good  Mood:  better  Affect:  appropriate and in normal range and mood congruent  Speech:  clear, coherent rate /rhythm are good  Psychomotor Behavior:  no evidence of tardive dyskinesia, dystonia, or tics and intact station, gait and muscle tone  Throught Process:  logical  Associations:  no loose associations  Thought Content:  no evidence of suicidal ideation or homicidal ideation, no evidence of psychotic thought, no auditory hallucinations present and no visual hallucinations present  Insight:  limited  Judgement:  intact  Oriented to:  time, person, and place  Attention Span and Concentration:  intact  Recent and Remote Memory:  intact  Language fund of knowledge are adequate         Labs:     No results found for: NTBNPI, NTBNP  Lab Results   Component Value Date    WBC 5.4 04/08/2017    HGB 15.2 04/08/2017    HCT 44.5 04/08/2017    MCV 90 04/08/2017     04/08/2017     Lab Results   Component Value Date    TSH 0.27 (L) 03/22/2012          Dx   Alcohol use dx  severe  bipolar affective dx most recent depressed with psychosis    Plan  Will contiue to detox of mssa on valium   will continue depakote 500 mg po bid,will order level  Will continue  latuda 20 mg po qd for paranoia  Will add seroquel 25-50 qid prn for pyschosis      Will be seen by medicine today due to urine -\"hard time peeing\", urine analysis ordered  Will keep seroquel prn and taper off it gradually   Laboratory/Imaging: reviewed with patient   Consults: internal medicine consult reviewed  Patient will be treated in therapeutic milieu with appropriate individual and group therapies as described.          Legal Status: voluntary    Safety Assessment:   Checks:  15 min  Precautions: withdrawal precautions  Pt has not required locked seclusion or restraints in the past 24 hours to maintain safety, please refer to RN documentation for further details.    Able to give informed " consent:  YES   Discussed Risks/Benefits/Side Effects/Alternatives: YES    After discussion of the indications, risks, benefits, alternatives and consequences of no treatment, the patient elects to complete detox and do trt.   regular consistency and thin liquids

## 2024-08-31 NOTE — PROGRESS NOTE ADULT - SUBJECTIVE AND OBJECTIVE BOX
CCU Attending Note    STEPHANIE RAPP  MRN-[id number]    Date of Admission: 08-30-24    Brief HPI:    24 hr events:    aspirin enteric coated 81 milliGRAM(s) Oral daily  atorvastatin 20 milliGRAM(s) Oral at bedtime  chlorhexidine 2% Cloths 1 Application(s) Topical daily  clopidogrel Tablet 75 milliGRAM(s) Oral daily  DOPamine Infusion 7.5 MICROgram(s)/kG/Min IV Continuous <Continuous>  DOPamine Infusion 2 MICROgram(s)/kG/Min IV Continuous <Continuous>  finasteride 5 milliGRAM(s) Oral daily  heparin   Injectable 3000 Unit(s) IV Push every 6 hours PRN  heparin   Injectable 6000 Unit(s) IV Push every 6 hours PRN  heparin  Infusion.  Unit(s)/Hr IV Continuous <Continuous>  norepinephrine Infusion 0.05 MICROgram(s)/kG/Min IV Continuous <Continuous>  pantoprazole    Tablet 40 milliGRAM(s) Oral before breakfast  sodium bicarbonate 650 milliGRAM(s) Oral every 8 hours      PHYSICAL EXAM:  Vitals reviewed    General Appearance: no acute distress	  Cardiovascular: regular rate and rhythm S1 S2, No JVD, No murmurs  Respiratory: clear bilaterally  Gastrointestinal:  soft, non-tender  Skin/Integumen: no edema  Neurologic: non focal  Musculoskeletal/ extremities: warm  Vascular: Peripheral pulses palpable 2+ bilaterally    LABS: Reviewed    CARDIAC TESTING:  ECG:  	  Telemetry:  Echocardiogram:  Coronary angiogram:  RHC:  Stress test:  cMRI:    OTHER DIAGNOSTIC TESTING:  CXR:  CT:   CCU Attending Note    STEPHANIE RAPP  MRN-[id number]    Date of Admission: 08-30-24    Brief HPI:    HR In 30s and hypotensive. started on dopamine and levophed     24 hr events: NAEON    aspirin enteric coated 81 milliGRAM(s) Oral daily  atorvastatin 20 milliGRAM(s) Oral at bedtime  chlorhexidine 2% Cloths 1 Application(s) Topical daily  clopidogrel Tablet 75 milliGRAM(s) Oral daily  DOPamine Infusion 7.5 MICROgram(s)/kG/Min IV Continuous <Continuous>  DOPamine Infusion 2 MICROgram(s)/kG/Min IV Continuous <Continuous>  finasteride 5 milliGRAM(s) Oral daily  heparin   Injectable 3000 Unit(s) IV Push every 6 hours PRN  heparin   Injectable 6000 Unit(s) IV Push every 6 hours PRN  heparin  Infusion.  Unit(s)/Hr IV Continuous <Continuous>  norepinephrine Infusion 0.05 MICROgram(s)/kG/Min IV Continuous <Continuous>  pantoprazole    Tablet 40 milliGRAM(s) Oral before breakfast  sodium bicarbonate 650 milliGRAM(s) Oral every 8 hours      PHYSICAL EXAM:  Vitals reviewed    General Appearance: no acute distress	  Cardiovascular: regular rate and rhythm S1 S2, No JVD, No murmurs  Respiratory: clear bilaterally  Gastrointestinal:  soft, non-tender  Skin/Integumen: no edema  Neurologic: non focal  Musculoskeletal/ extremities: warm  Vascular: Peripheral pulses palpable 2+ bilaterally    LABS: Reviewed    CARDIAC TESTING:  ECG:  	  Telemetry: AF  Echocardiogram:   Coronary angiogram:  RHC:  Stress test:  cMRI:    OTHER DIAGNOSTIC TESTING:  CXR: no acute pahtology   CT:   CCU Attending Note    STEPHANIE RAPP  MRN-[id number]    Date of Admission: 08-30-24    Brief HPI: 89-year-old male past medical history CAD status post recent PCI, hypertension, hyperlipidemia, A-fib, CKD stage IIIb who presents with symptomatic bradycardia.    24 hr events: NAEON    aspirin enteric coated 81 milliGRAM(s) Oral daily  atorvastatin 20 milliGRAM(s) Oral at bedtime  chlorhexidine 2% Cloths 1 Application(s) Topical daily  clopidogrel Tablet 75 milliGRAM(s) Oral daily  DOPamine Infusion 7.5 MICROgram(s)/kG/Min IV Continuous <Continuous>  DOPamine Infusion 2 MICROgram(s)/kG/Min IV Continuous <Continuous>  finasteride 5 milliGRAM(s) Oral daily  heparin   Injectable 3000 Unit(s) IV Push every 6 hours PRN  heparin   Injectable 6000 Unit(s) IV Push every 6 hours PRN  heparin  Infusion.  Unit(s)/Hr IV Continuous <Continuous>  norepinephrine Infusion 0.05 MICROgram(s)/kG/Min IV Continuous <Continuous>  pantoprazole    Tablet 40 milliGRAM(s) Oral before breakfast  sodium bicarbonate 650 milliGRAM(s) Oral every 8 hours      PHYSICAL EXAM:  Vitals reviewed    General Appearance: no acute distress	  Cardiovascular: regular rate and rhythm S1 S2, No JVP  Respiratory: clear bilaterally  Gastrointestinal:  soft, non-tender  Skin/Integumen: no edema  Neurologic: non focal  Musculoskeletal/ extremities: warm  Vascular: Peripheral pulses palpable 2+ bilaterally    LABS: Reviewed    CARDIAC TESTING:  ECG:  	  Telemetry: AF  Echocardiogram: LV systolic function normal  Coronary angiogram:  RHC:  Stress test:  cMRI:    OTHER DIAGNOSTIC TESTING:  CXR: no acute pahtology   CT:

## 2024-08-31 NOTE — PATIENT PROFILE ADULT - FUNCTIONAL ASSESSMENT - BASIC MOBILITY 2.
3 = A little assistance 5-Fu Counseling: 5-Fluorouracil Counseling:  I discussed with the patient the risks of 5-fluorouracil including but not limited to erythema, scaling, itching, weeping, crusting, and pain.

## 2024-08-31 NOTE — SWALLOW BEDSIDE ASSESSMENT ADULT - SLP PERTINENT HISTORY OF CURRENT PROBLEM
89-year-old male past medical history CAD status post recent PCI, hypertension, hyperlipidemia, A-fib, CKD stage IIIb who presents with symptomatic bradycardia.

## 2024-08-31 NOTE — PROGRESS NOTE ADULT - ASSESSMENT
Plan  -Wean dopamine   -Wean pressors as hemodynamically tolerable to maintain MAP>65  -Increase atorvastatin to 40 mg  -Continue ASA/Plavix 89-year-old male past medical history CAD status post recent PCI, hypertension, hyperlipidemia, A-fib, CKD stage IIIb who presents with symptomatic bradycardia.     Patient recently had antiarrhythmics uptitrated to carvedilol 25 mg twice daily.  Upon arrival to ED patient was noted to have heart rate in the 30s and hypotensive.  Patient was started on Levophed and dopamine and transferred to CCU.    Labs reviewed notable for slight leukocytosis that is improving.  Creatinine ~3 but patient with known CKD stage IIIb.  Echo reviewed with normal LV systolic function.  Patient with known CAD and recently received PCI to RCA in August at [facility].  He continues to be on aspirin/Plavix.  Increase atorvastatin to 40 mg daily for uncontrolled lipids.    On exam he is warm, well-perfused, and euvolemic.  He is currently supported on Levophed and dopamine with heart rates in the 90s.  We will wean Levophed and dopamine as tolerated.  Etiology of symptomatic bradycardia was most likely due to overmedication.  After weaning from dopamine we will slowly reintroduce antinodal agents.  However if patient continues to have symptomatic bradycardia then may benefit from a pacemaker.  EP is on board.    Plan  -Wean dopamine   -Wean pressors as hemodynamically tolerable to maintain MAP>65  -Increase atorvastatin to 40 mg  -Continue ASA/Plavix    Wilbert Michael MD  Interventional Cardiology/Advanced Heart Failure Transplant   89-year-old male past medical history CAD status post recent PCI, hypertension, hyperlipidemia, A-fib, CKD stage IIIb who presents with symptomatic bradycardia.     Patient recently had antiarrhythmics uptitrated to carvedilol 25 mg twice daily.  Upon arrival to ED patient was noted to have heart rate in the 30s and hypotensive.  Patient was started on Levophed and dopamine and transferred to CCU.    Labs reviewed notable for slight leukocytosis that is improving.  Creatinine ~3 but patient with known CKD stage IIIb.  Echo reviewed with normal LV systolic function.  Patient with known CAD and recently received PCI to RCA in August at [facility].  He continues to be on aspirin/Plavix.  Increase atorvastatin to 40 mg daily for uncontrolled lipids.    On exam he is warm, well-perfused, and euvolemic.  He is currently supported on Levophed and dopamine with heart rates in the 90s.  We will wean Levophed and dopamine as tolerated.  Etiology of symptomatic bradycardia was most likely due to overmedication.  After weaning from dopamine we will slowly reintroduce antinodal agents.  However if patient continues to have symptomatic bradycardia then may benefit from a pacemaker.  EP is on board.    Plan  -Wean dopamine   -Wean pressors as hemodynamically tolerable to maintain MAP>65  -Increase atorvastatin to 40 mg  -Continue ASA/Plavix  -Continue heparin gtt for AF    Wilbert Michael MD  Interventional Cardiology/Advanced Heart Failure Transplant

## 2024-08-31 NOTE — PATIENT PROFILE ADULT - FALL HARM RISK - HARM RISK INTERVENTIONS

## 2024-08-31 NOTE — PROGRESS NOTE ADULT - ASSESSMENT
89 y.o M with PMH of CAD severe AS and Afib; admitted for symptomatic unstable bradycardia.     IMPRESSION:    #Symptomatic unstable Afib with Slow VR   #baseline persistent AF   #CAD s/p [facility] 8/23/24: p-mRCA 80-95% s/p rota and WOLF x2; and planned for staged PCI of LAD / LCx   #severe low flow, low gradient AS  #HTN  #ALESSANDRA on CKD Stage 3b (unclear baseline)    PLAN:    CNS:   - Avoid CNS Depressants   - at baseline patient is alert oriented x1; currently he is alert but drowsy; non focal neuro exam   - CT head : No acute intracranial pathology. Moderate-severe chronic microvascular ischemic changes.    HEENT:   - Oral care.   - Aspiration precautions     PULMONARY:   - HOB @ 45.   - Monitor Pulse Ox. Keep > 93%. Supplement as needed.    CARDIOVASCULAR:   - trop 249 -> 202   - ECG on admission: a fib with SVR   - Daily Weight.   - Strict I&Os. Keep I < O  - s/p atropine and glucagon   - EP recs appreciated  - wean dopa to 5. If successfully weaned off, can restart coreg at lower dose considering severe CAD. No need for temp pacemaker at this moment.  - hold carvedilol, nifedipine, hydralazine, losartan, tamsulosin, farxiga,   - c/w aspirin, clopidogrel and statin for CAD  - switch eliquis to heparin drip in case any procedure will be performed  - NPO after midnight in case a PM will be placed  - f up TSH      GI:   - GI prophylaxis.   - S&S eval: normal.  - start regular diet    RENAL:   - Monitor BMP/Cr  - Avoid nephrotoxic agents   - ALESSANDRA -> likely from hypotension (f up urine studies and US kidney/bladder)  - start sodium bicarb 650 TID to target bicarb 22; (now it is 14)  - f up repeat BMP ( to replete K+ and calcium)     INFECTIOUS DISEASE:   - Monitor WBC  - no signs or symptoms of infection    HEMATOLOGICAL:   - Monitor H&H, Keep active T&S  - heparin drip instead of eliquis    ENDOCRINE:   - recent A1c within normal limits   - f up TSH    MUSCULOSKELETAL:   - increase as tolerated   - hematoma at the level of the left thigh -> monitor to assess improvement    Lines: Right IJ central line; Left radial A line  CODE STATUS: full    DISPOSITION: CCU           89 y.o M with PMH of CAD severe AS and Afib; admitted for symptomatic unstable bradycardia.     IMPRESSION:    #Symptomatic unstable Afib with Slow VR   #baseline persistent AF   #CAD s/p [facility] 8/23/24: p-mRCA 80-95% s/p rota and WOLF x2; and planned for staged PCI of LAD / LCx   #severe low flow, low gradient AS  #HTN  #ALESSANDRA on CKD Stage 3b (unclear baseline)    PLAN:    CNS:   - Avoid CNS Depressants   - at baseline patient is alert oriented x1; currently he is alert but drowsy; non focal neuro exam   - CT head : No acute intracranial pathology. Moderate-severe chronic microvascular ischemic changes.    HEENT:   - Oral care.   - Aspiration precautions     PULMONARY:   - HOB @ 45.   - Monitor Pulse Ox. Keep > 93%. Supplement as needed.    CARDIOVASCULAR:   - trop 249 -> 202   - ECG on admission: a fib with SVR   - Daily Weight.   - Strict I&Os. Keep I < O  - s/p atropine and glucagon   - EP recs appreciated  - wean dopa to 5. wean off levo. If successfully weaned off, can restart coreg at lower dose considering severe CAD. No need for temp pacemaker at this moment.  - hold carvedilol, nifedipine, hydralazine, losartan, tamsulosin, farxiga,   - c/w aspirin, clopidogrel and statin for CAD  - switch eliquis to heparin drip in case any procedure will be performed  - NPO after midnight in case a PM will be placed  - f up TSH      GI:   - GI prophylaxis.   - S&S eval: normal.  - start regular diet    RENAL:   - Monitor BMP/Cr  - Avoid nephrotoxic agents   - ALESSANDRA -> likely from hypotension (f up urine studies and US kidney/bladder)  - start sodium bicarb 650 TID to target bicarb 22; (now it is 14)  - f up repeat BMP ( to replete K+ and calcium)     INFECTIOUS DISEASE:   - Monitor WBC  - no signs or symptoms of infection    HEMATOLOGICAL:   - Monitor H&H, Keep active T&S  - heparin drip instead of eliquis    ENDOCRINE:   - recent A1c within normal limits   - f up TSH    MUSCULOSKELETAL:   - increase as tolerated   - hematoma at the level of the left thigh -> monitor to assess improvement    Lines: Right IJ central line; Left radial A line  CODE STATUS: full    DISPOSITION: CCU

## 2024-08-31 NOTE — PROGRESS NOTE ADULT - SUBJECTIVE AND OBJECTIVE BOX
Patient is a 89y old  Male who presents with a chief complaint of Bradycardia (30 Aug 2024 19:05)    HPI:  89M, Cantonese speaking, with PMHx:  - persistent AF (on Eliquis),   - HTN,   - HLD,   - CKD Stage 3b (unclear baseline),   - thoracic aortic ectasia,   - BPH,   - vitamin D deficiency,   - chronic hepatitis B,   - CAD and severe low flow, low gradient AS:   s/p Left and right Heart cath on 24: p-mRCA 80-95% s/p rota and WLOF x2; dLM 30%; LAD 80%, mLAD 90%; D1 mod diffuse, D2 mod diffuse; LCx heavily calcified 80% diffuse; OM2 anio FFR 0.72. s/p RHC 24: RA 13; RV 31/10; PA 38/23 (29); PCWP 19; CO / CI 3.13 / 1.8; AoSat 98/8%; PaSat 68.2%; dopamine 1mcg / kg / min for AV valve studypeak to peak ratio 10.4mmHg; mean 13.75mmHg; MATT 0.91cm; SV 22.63.   - TTE 24: EF 55-60% mild concentric LVH; no RWMA; LA severely dilated; severe AS; mean 19.7; MATT 0.7; V max 2.95    Patient woke up today very lethargic and weak .  Family called EMS; on their arrival, he was bradycardic to the 30s and with unobtainable blood pressure upon arrival.   Given 1 of atropine and 250 cc bolus with some improvement.    He was transcutaneously paced en route.   on arrival to the emergency department he was bradycardic and hypotensive.   Given that he didn't improve after atropine, he was started on dopamine and levophed; and was given glucagon.     Patient was recently discharged on coreg 6.25 BID and Cardizem 180, however on Wednesday, meds were adjusted by his cardiologist in [facility] Dr Scott to carvedilol 25 mg BID and Nifedipine 90 mg XL OD   Also hydralazine 10 mg and valsartan 40 mg that he was discharged on were switched to losartan 100 mg OD   (30 Aug 2024 15:38)       INTERVAL HPI/OVERNIGHT EVENTS:   No overnight events   Afebrile, hemodynamically stable     Subjective:    ICU Vital Signs Last 24 Hrs  T(C): 36.1 (31 Aug 2024 16:00), Max: 36.2 (31 Aug 2024 08:00)  T(F): 97 (31 Aug 2024 16:00), Max: 97.1 (31 Aug 2024 08:00)  HR: 80 (31 Aug 2024 17:00) (65 - 97)  BP: --  BP(mean): --  ABP: 121/64 (31 Aug 2024 17:00) (94/53 - 127/72)  ABP(mean): 85 (31 Aug 2024 17:00) (67 - 90)  RR: 23 (31 Aug 2024 17:00) (16 - 26)  SpO2: 96% (31 Aug 2024 17:00) (92% - 99%)    O2 Parameters below as of 31 Aug 2024 17:00  Patient On (Oxygen Delivery Method): room air          I&O's Summary    30 Aug 2024 07:01  -  31 Aug 2024 07:00  --------------------------------------------------------  IN: 1248.8 mL / OUT: 0 mL / NET: 1248.8 mL    31 Aug 2024 07:01  -  31 Aug 2024 17:33  --------------------------------------------------------  IN: 620.8 mL / OUT: 0 mL / NET: 620.8 mL          Daily Height in cm: 170.18 (31 Aug 2024 00:00)    Daily     Adult Advanced Hemodynamics Last 24 Hrs  CVP(mm Hg): --  CVP(cm H2O): --  CO: --  CI: --  PA: --  PA(mean): --  PCWP: --  SVR: --  SVRI: --  PVR: --  PVRI: --    EKG/Telemetry Events:    MEDICATIONS  (STANDING):  aspirin enteric coated 81 milliGRAM(s) Oral daily  atorvastatin 40 milliGRAM(s) Oral at bedtime  chlorhexidine 2% Cloths 1 Application(s) Topical daily  clopidogrel Tablet 75 milliGRAM(s) Oral daily  DOPamine Infusion 5 MICROgram(s)/kG/Min (13.6 mL/Hr) IV Continuous <Continuous>  finasteride 5 milliGRAM(s) Oral daily  heparin  Infusion. 900 Unit(s)/Hr (9 mL/Hr) IV Continuous <Continuous>  norepinephrine Infusion 0.05 MICROgram(s)/kG/Min (3.41 mL/Hr) IV Continuous <Continuous>  pantoprazole    Tablet 40 milliGRAM(s) Oral before breakfast  sodium bicarbonate 650 milliGRAM(s) Oral every 8 hours    MEDICATIONS  (PRN):  heparin   Injectable 6000 Unit(s) IV Push every 6 hours PRN For aPTT less than 40  heparin   Injectable 3000 Unit(s) IV Push every 6 hours PRN For aPTT between 40 - 57      PHYSICAL EXAM:  GENERAL:   HEAD:  Atraumatic, Normocephalic  EYES: EOMI, PERRLA, conjunctiva and sclera clear  NECK: Supple, No JVD, Normal thyroid, no enlarged nodes  NERVOUS SYSTEM:  Alert & Awake.   CHEST/LUNG: B/L good air entry; No rales, rhonchi, or wheezing  HEART: S1S2 normal, no S3, Regular rate and rhythm; No murmurs  ABDOMEN: Soft, Nontender, Nondistended; Bowel sounds present  EXTREMITIES:  2+ Peripheral Pulses, No clubbing, cyanosis, or edema  LYMPH: No lymphadenopathy noted  SKIN: No rashes or lesions    LABS:                        12.0   10.46 )-----------( 206      ( 31 Aug 2024 16:42 )             35.4         134<L>  |  102  |  42<H>  ----------------------------<  185<H>  4.3   |  14<L>  |  3.1<H>    Ca    8.0<L>      31 Aug 2024 05:30  Phos  4.3       Mg     2.2         TPro  6.3  /  Alb  3.8  /  TBili  0.8  /  DBili  x   /  AST  46<H>  /  ALT  62<H>  /  AlkPhos  111  08-31    LIVER FUNCTIONS - ( 31 Aug 2024 05:30 )  Alb: 3.8 g/dL / Pro: 6.3 g/dL / ALK PHOS: 111 U/L / ALT: 62 U/L / AST: 46 U/L / GGT: x           PT/INR - ( 30 Aug 2024 20:38 )   PT: 11.20 sec;   INR: 0.98 ratio         PTT - ( 31 Aug 2024 16:42 )  PTT:113.1 sec  CAPILLARY BLOOD GLUCOSE      POCT Blood Glucose.: 201 mg/dL (30 Aug 2024 23:47)    ABG - ( 30 Aug 2024 19:15 )  pH, Arterial: 7.33  pH, Blood: x     /  pCO2: 31    /  pO2: 133   / HCO3: 16    / Base Excess: -8.4  /  SaO2: 99.7                    Urinalysis Basic - ( 31 Aug 2024 17:13 )    Color: Dark Yellow / Appearance: Cloudy / S.018 / pH: x  Gluc: x / Ketone: Trace mg/dL  / Bili: Negative / Urobili: 1.0 mg/dL   Blood: x / Protein: 300 mg/dL / Nitrite: Negative   Leuk Esterase: Moderate / RBC: x / WBC x   Sq Epi: x / Non Sq Epi: x / Bacteria: x          RADIOLOGY & ADDITIONAL TESTS:  CXR:        Care Discussed with Consultants/Other Providers [ x] YES  [ ] NO

## 2024-09-01 LAB
ALBUMIN SERPL ELPH-MCNC: 3.3 G/DL — LOW (ref 3.5–5.2)
ALP SERPL-CCNC: 83 U/L — SIGNIFICANT CHANGE UP (ref 30–115)
ALT FLD-CCNC: 41 U/L — SIGNIFICANT CHANGE UP (ref 0–41)
ANION GAP SERPL CALC-SCNC: 13 MMOL/L — SIGNIFICANT CHANGE UP (ref 7–14)
ANION GAP SERPL CALC-SCNC: 13 MMOL/L — SIGNIFICANT CHANGE UP (ref 7–14)
APTT BLD: 56.4 SEC — HIGH (ref 27–39.2)
APTT BLD: 64.5 SEC — HIGH (ref 27–39.2)
APTT BLD: 93.1 SEC — CRITICAL HIGH (ref 27–39.2)
APTT BLD: 95 SEC — CRITICAL HIGH (ref 27–39.2)
AST SERPL-CCNC: 26 U/L — SIGNIFICANT CHANGE UP (ref 0–41)
B-OH-BUTYR SERPL-SCNC: <0.2 MMOL/L — SIGNIFICANT CHANGE UP
BASOPHILS # BLD AUTO: 0.02 K/UL — SIGNIFICANT CHANGE UP (ref 0–0.2)
BASOPHILS NFR BLD AUTO: 0.2 % — SIGNIFICANT CHANGE UP (ref 0–1)
BILIRUB SERPL-MCNC: 0.5 MG/DL — SIGNIFICANT CHANGE UP (ref 0.2–1.2)
BUN SERPL-MCNC: 44 MG/DL — HIGH (ref 10–20)
BUN SERPL-MCNC: 49 MG/DL — HIGH (ref 10–20)
CALCIUM SERPL-MCNC: 7.4 MG/DL — LOW (ref 8.4–10.5)
CALCIUM SERPL-MCNC: 7.8 MG/DL — LOW (ref 8.4–10.5)
CHLORIDE SERPL-SCNC: 102 MMOL/L — SIGNIFICANT CHANGE UP (ref 98–110)
CHLORIDE SERPL-SCNC: 106 MMOL/L — SIGNIFICANT CHANGE UP (ref 98–110)
CO2 SERPL-SCNC: 15 MMOL/L — LOW (ref 17–32)
CO2 SERPL-SCNC: 18 MMOL/L — SIGNIFICANT CHANGE UP (ref 17–32)
CREAT SERPL-MCNC: 2.8 MG/DL — HIGH (ref 0.7–1.5)
CREAT SERPL-MCNC: 3.2 MG/DL — HIGH (ref 0.7–1.5)
EGFR: 18 ML/MIN/1.73M2 — LOW
EGFR: 21 ML/MIN/1.73M2 — LOW
EOSINOPHIL # BLD AUTO: 0.06 K/UL — SIGNIFICANT CHANGE UP (ref 0–0.7)
EOSINOPHIL NFR BLD AUTO: 0.7 % — SIGNIFICANT CHANGE UP (ref 0–8)
GLUCOSE SERPL-MCNC: 123 MG/DL — HIGH (ref 70–99)
GLUCOSE SERPL-MCNC: 86 MG/DL — SIGNIFICANT CHANGE UP (ref 70–99)
HCT VFR BLD CALC: 31.6 % — LOW (ref 42–52)
HGB BLD-MCNC: 10.8 G/DL — LOW (ref 14–18)
IMM GRANULOCYTES NFR BLD AUTO: 0.4 % — HIGH (ref 0.1–0.3)
LACTATE SERPL-SCNC: 1 MMOL/L — SIGNIFICANT CHANGE UP (ref 0.7–2)
LYMPHOCYTES # BLD AUTO: 1.42 K/UL — SIGNIFICANT CHANGE UP (ref 1.2–3.4)
LYMPHOCYTES # BLD AUTO: 17.4 % — LOW (ref 20.5–51.1)
MAGNESIUM SERPL-MCNC: 2.3 MG/DL — SIGNIFICANT CHANGE UP (ref 1.8–2.4)
MCHC RBC-ENTMCNC: 31.6 PG — HIGH (ref 27–31)
MCHC RBC-ENTMCNC: 34.2 G/DL — SIGNIFICANT CHANGE UP (ref 32–37)
MCV RBC AUTO: 92.4 FL — SIGNIFICANT CHANGE UP (ref 80–94)
MONOCYTES # BLD AUTO: 0.71 K/UL — HIGH (ref 0.1–0.6)
MONOCYTES NFR BLD AUTO: 8.7 % — SIGNIFICANT CHANGE UP (ref 1.7–9.3)
NEUTROPHILS # BLD AUTO: 5.93 K/UL — SIGNIFICANT CHANGE UP (ref 1.4–6.5)
NEUTROPHILS NFR BLD AUTO: 72.6 % — SIGNIFICANT CHANGE UP (ref 42.2–75.2)
NRBC # BLD: 0 /100 WBCS — SIGNIFICANT CHANGE UP (ref 0–0)
PHOSPHATE SERPL-MCNC: 4.3 MG/DL — SIGNIFICANT CHANGE UP (ref 2.1–4.9)
PLATELET # BLD AUTO: 160 K/UL — SIGNIFICANT CHANGE UP (ref 130–400)
PMV BLD: 10.4 FL — SIGNIFICANT CHANGE UP (ref 7.4–10.4)
POTASSIUM SERPL-MCNC: 3.4 MMOL/L — LOW (ref 3.5–5)
POTASSIUM SERPL-MCNC: 4.2 MMOL/L — SIGNIFICANT CHANGE UP (ref 3.5–5)
POTASSIUM SERPL-SCNC: 3.4 MMOL/L — LOW (ref 3.5–5)
POTASSIUM SERPL-SCNC: 4.2 MMOL/L — SIGNIFICANT CHANGE UP (ref 3.5–5)
PROT SERPL-MCNC: 5.3 G/DL — LOW (ref 6–8)
RBC # BLD: 3.42 M/UL — LOW (ref 4.7–6.1)
RBC # FLD: 13.2 % — SIGNIFICANT CHANGE UP (ref 11.5–14.5)
SODIUM SERPL-SCNC: 133 MMOL/L — LOW (ref 135–146)
SODIUM SERPL-SCNC: 134 MMOL/L — LOW (ref 135–146)
WBC # BLD: 8.17 K/UL — SIGNIFICANT CHANGE UP (ref 4.8–10.8)
WBC # FLD AUTO: 8.17 K/UL — SIGNIFICANT CHANGE UP (ref 4.8–10.8)

## 2024-09-01 PROCEDURE — 99291 CRITICAL CARE FIRST HOUR: CPT

## 2024-09-01 PROCEDURE — 93010 ELECTROCARDIOGRAM REPORT: CPT

## 2024-09-01 PROCEDURE — 99233 SBSQ HOSP IP/OBS HIGH 50: CPT

## 2024-09-01 RX ORDER — NIFEDIPINE 60 MG/1
30 TABLET, FILM COATED, EXTENDED RELEASE ORAL DAILY
Refills: 0 | Status: DISCONTINUED | OUTPATIENT
Start: 2024-09-01 | End: 2024-09-02

## 2024-09-01 RX ORDER — POTASSIUM CHLORIDE 10 MEQ
40 TABLET, EXT RELEASE, PARTICLES/CRYSTALS ORAL ONCE
Refills: 0 | Status: COMPLETED | OUTPATIENT
Start: 2024-09-01 | End: 2024-09-01

## 2024-09-01 RX ORDER — HYDRALAZINE HCL 50 MG
25 TABLET ORAL ONCE
Refills: 0 | Status: COMPLETED | OUTPATIENT
Start: 2024-09-01 | End: 2024-09-01

## 2024-09-01 RX ORDER — NIFEDIPINE 60 MG/1
60 TABLET, FILM COATED, EXTENDED RELEASE ORAL ONCE
Refills: 0 | Status: COMPLETED | OUTPATIENT
Start: 2024-09-01 | End: 2024-09-01

## 2024-09-01 RX ORDER — POTASSIUM CHLORIDE 10 MEQ
20 TABLET, EXT RELEASE, PARTICLES/CRYSTALS ORAL ONCE
Refills: 0 | Status: COMPLETED | OUTPATIENT
Start: 2024-09-01 | End: 2024-09-01

## 2024-09-01 RX ADMIN — Medication 20 MILLIEQUIVALENT(S): at 10:52

## 2024-09-01 RX ADMIN — SODIUM BICARBONATE 650 MILLIGRAM(S): 84 INJECTION, SOLUTION INTRAVENOUS at 13:42

## 2024-09-01 RX ADMIN — Medication 900 UNIT(S)/HR: at 08:13

## 2024-09-01 RX ADMIN — Medication 900 UNIT(S)/HR: at 15:56

## 2024-09-01 RX ADMIN — SODIUM BICARBONATE 650 MILLIGRAM(S): 84 INJECTION, SOLUTION INTRAVENOUS at 21:36

## 2024-09-01 RX ADMIN — NIFEDIPINE 30 MILLIGRAM(S): 60 TABLET, FILM COATED, EXTENDED RELEASE ORAL at 21:36

## 2024-09-01 RX ADMIN — Medication 75 MILLIGRAM(S): at 11:36

## 2024-09-01 RX ADMIN — FINASTERIDE 5 MILLIGRAM(S): 1 TABLET, FILM COATED ORAL at 11:36

## 2024-09-01 RX ADMIN — NIFEDIPINE 60 MILLIGRAM(S): 60 TABLET, FILM COATED, EXTENDED RELEASE ORAL at 22:37

## 2024-09-01 RX ADMIN — Medication 40 MILLIEQUIVALENT(S): at 06:57

## 2024-09-01 RX ADMIN — CHLORHEXIDINE GLUCONATE 1 APPLICATION(S): 40 SOLUTION TOPICAL at 11:36

## 2024-09-01 RX ADMIN — SODIUM BICARBONATE 650 MILLIGRAM(S): 84 INJECTION, SOLUTION INTRAVENOUS at 05:02

## 2024-09-01 RX ADMIN — Medication 40 MILLIGRAM(S): at 05:02

## 2024-09-01 RX ADMIN — Medication 900 UNIT(S)/HR: at 22:41

## 2024-09-01 RX ADMIN — Medication 40 MILLIGRAM(S): at 21:36

## 2024-09-01 RX ADMIN — Medication 700 UNIT(S)/HR: at 01:26

## 2024-09-01 RX ADMIN — Medication 81 MILLIGRAM(S): at 11:36

## 2024-09-01 NOTE — PROGRESS NOTE ADULT - ASSESSMENT
89-year-old male past medical history CAD status post recent PCI, hypertension, hyperlipidemia, A-fib, CKD stage IIIb who presents with symptomatic bradycardia.     The patient had his medications adjusted recently; most notably carvedilol increased from 6.25mg BID to 25mg BID. This is the most likely etiology of his admission. His last echo showed an EF of 65-70% and moderate aortic stenosis (Vmax 3.56, MATT 1.43)    On exam he is warm, well-perfused, and euvolemic.  He is currently on dopamine with heart rates in the 70s.  We will wean dopamine as tolerated. Levophed has been weaned off and the blood pressure has been normotensive and stable.  After weaning off dopamine home meds can be restarted gradually.  However if patient continues to have symptomatic bradycardia then may benefit from a pacemaker.  EP is on board.    IMPRESSION:    #Symptomatic unstable Afib with Slow VR   #baseline persistent AF   #CAD s/p [facility] 8/23/24: p-mRCA 80-95% s/p rota and WOLF x2; and planned for staged PCI of LAD / LCx   #severe low flow, low gradient AS  #HTN  #ALESSANDRA on CKD Stage 3b (unclear baseline)      PLAN:    CNS: AO3, no need for sedation. CTH negative.    PULMONARY: On room air saturating at 100%. HOB @ 45. Monitor Pulse Ox. Keep > 93%. Supplement as needed.    CARDIOVASCULAR:   - Weaned off levophed, wean off dopamine as tolerated. Dopamine currently @ 5  -Continue atorvastatin 40 mg  -Continue DAPT (ASA/Plavix)  -Continue heparin gtt for AF    GI:   - GI prophylaxis.   - S&S eval; keep NPO for now except meds (should not skip aspirin and plavix given the recent stents) to prevent aspiration; unless improvement in LOC. if patient not able to swallow the pills -> will need NG tube placement.     RENAL:   - Monitor BMP/Cr  - Avoid nephrotoxic agents   - ALESSANDRA -> likely from hypotension (f up urine studies and US kidney/bladder)  - start sodium bicarb 650 TID to target bicarb 22; (now it is 14)  - f up repeat BMP ( to replete K+ and calcium)     INFECTIOUS DISEASE:   - Monitor WBC  - no signs or symptoms of infection    HEMATOLOGICAL:   - Monitor H&H, Keep active T&S  - heparin drip instead of eliquis    ENDOCRINE:   - recent A1c within normal limits   - f up TSH    MUSCULOSKELETAL:   - increase as tolerated   - hematoma at the level of the left thigh -> monitor to assess improvement    Lines: Right IJ central line; Left radial A line  CODE STATUS: full    DISPOSITION: CCU

## 2024-09-01 NOTE — PROGRESS NOTE ADULT - ASSESSMENT
89-year-old male past medical history CAD status post recent PCI, hypertension, hyperlipidemia, A-fib, CKD stage IIIb who presents with symptomatic bradycardia.     Patient recently had antiarrhythmics uptitrated to carvedilol 25 mg twice daily.  Upon arrival to ED patient was noted to have heart rate in the 30s and hypotensive.  Patient was started on Levophed and dopamine and transferred to CCU.    Labs reviewed notable for slight leukocytosis that is improving.  Creatinine ~3 but patient with known CKD stage IIIb.  Echo reviewed with normal LV systolic function.  Patient with known CAD and recently received PCI to RCA in August at [facility].  He continues to be on aspirin/Plavix.  Increase atorvastatin to 40 mg daily for uncontrolled lipids.    On exam he is warm, well-perfused, and euvolemic.  He is currently supported on Levophed and dopamine with heart rates in the 90s.  We will wean Levophed and dopamine as tolerated.  Etiology of symptomatic bradycardia was most likely due to overmedication.  After weaning from dopamine we will slowly reintroduce antinodal agents.  However if patient continues to have symptomatic bradycardia then may benefit from a pacemaker.  EP is on board.    Plan  -Wean dopamine   -Continue atorvastatin to 40 mg  -Continue ASA/Plavix  -Continue heparin gtt for AF    Wilbert Michael MD  Interventional Cardiology/Advanced Heart Failure Transplant   89-year-old male past medical history CAD status post recent PCI, hypertension, hyperlipidemia, A-fib, CKD stage IIIb who presents with symptomatic bradycardia.     Patient recently had antiarrhythmics uptitrated to carvedilol 25 mg twice daily.  Upon arrival to ED patient was noted to have heart rate in the 30s and hypotensive.  Patient was started on Levophed and dopamine and transferred to CCU.    Labs reviewed notable for leukoctyosios improving.  Creatinine ~3 but patient with known CKD stage IIIb.  Echo reviewed with normal LV systolic function.  Patient with known CAD and recently received PCI to RCA in August at [facility].  He continues to be on aspirin/Plavix/heparin gtt.  Continue atorvastatin to 40 mg daily for uncontrolled lipids.    On exam he is warm, well-perfused, and euvolemic.  He is currently supported on dopamine with heart rates in the 90s.  We will wean dopamine as tolerated and if tolerable restart small dose of antinodal agent tomorrow.  Etiology of symptomatic bradycardia was most likely due to overmedication. However if patient continues to have symptomatic bradycardia then may benefit from a pacemaker.  EP is on board.    Plan  -Wean dopamine   -Continue atorvastatin to 40 mg  -Continue ASA/Plavix  -Continue heparin gtt for AF    Wilbert Michael MD  Interventional Cardiology/Advanced Heart Failure Transplant

## 2024-09-01 NOTE — PROGRESS NOTE ADULT - SUBJECTIVE AND OBJECTIVE BOX
INTERVAL HPI/OVERNIGHT EVENTS:  doing well  off Levo  Dopa is down 2.5, HR in 80s  **Upon discussion with daughter:  on 8/24 pt was discahreged from [facility] on  Coreg 6.25mg bid, Diltiazem CD 180mg daily, along with Vasartan and Hydralazine  on 8/29 patient followed up with primary cardiolgist and medications were changed to :  Diltiazem remained the same, changed Coreg 25mg bid, along with Losartan and Procardia all started on Thu PM, and Fri after AM dose patient was bradycardic and hypotensive       MEDICATIONS  (STANDING):  aspirin enteric coated 81 milliGRAM(s) Oral daily  atorvastatin 40 milliGRAM(s) Oral at bedtime  chlorhexidine 2% Cloths 1 Application(s) Topical daily  clopidogrel Tablet 75 milliGRAM(s) Oral daily  finasteride 5 milliGRAM(s) Oral daily  heparin  Infusion. 900 Unit(s)/Hr (9 mL/Hr) IV Continuous <Continuous>  pantoprazole    Tablet 40 milliGRAM(s) Oral before breakfast  sodium bicarbonate 650 milliGRAM(s) Oral every 8 hours    MEDICATIONS  (PRN):  heparin   Injectable 6000 Unit(s) IV Push every 6 hours PRN For aPTT less than 40  heparin   Injectable 3000 Unit(s) IV Push every 6 hours PRN For aPTT between 40 - 57      Allergies    No Known Allergies    Intolerances        REVIEW OF SYSTEMS    [x ] A ten-point review of systems was otherwise negative except as noted.  [ ] Due to altered mental status/intubation, subjective information were not able to be obtained from the patient. History was obtained, to the extent possible, from review of the chart and collateral sources of information.      Vital Signs Last 24 Hrs  T(C): 36.1 (01 Sep 2024 12:00), Max: 36.8 (31 Aug 2024 20:00)  T(F): 97 (01 Sep 2024 12:00), Max: 98.3 (31 Aug 2024 20:00)  HR: 69 (01 Sep 2024 12:00) (63 - 86)  BP: --  BP(mean): --  RR: 21 (01 Sep 2024 12:00) (18 - 29)  SpO2: 100% (01 Sep 2024 12:00) (93% - 100%)    Parameters below as of 01 Sep 2024 12:00  Patient On (Oxygen Delivery Method): room air        08-31-24 @ 07:01  -  09-01-24 @ 07:00  --------------------------------------------------------  IN: 1078.7 mL / OUT: 860 mL / NET: 218.7 mL    09-01-24 @ 07:01  -  09-01-24 @ 13:58  --------------------------------------------------------  IN: 88.3 mL / OUT: 0 mL / NET: 88.3 mL        PHYSICAL EXAM:    GENERAL: In no apparent distress, well nourished, and hydrated.  HEART: IRRegular rate and rhythm; No murmur; NO rubs, or gallops.  PULMONARY: Clear to auscultation and percussion.  Normal expansion/effort. No rales, wheezing, or rhonchi bilaterally.  ABDOMEN: Soft, Nontender, Nondistended; Bowel sounds present  EXTREMITIES:  Extremities warm, pink, well-perfused, 2+ Peripheral Pulses, No clubbing, cyanosis, or edema  NEUROLOGICAL: alert & oriented x 3, no focal deficits, PERRLA, EOMI    LABS:                        10.8   8.17  )-----------( 160      ( 01 Sep 2024 04:20 )             31.6     09-01    133<L>  |  102  |  49<H>  ----------------------------<  123<H>  3.4<L>   |  18  |  3.2<H>    Ca    7.4<L>      01 Sep 2024 04:20  Phos  4.3     09-01  Mg     2.3     09-01    TPro  5.3<L>  /  Alb  3.3<L>  /  TBili  0.5  /  DBili  x   /  AST  26  /  ALT  41  /  AlkPhos  83  09-01    PT/INR - ( 30 Aug 2024 20:38 )   PT: 11.20 sec;   INR: 0.98 ratio         PTT - ( 01 Sep 2024 06:56 )  PTT:56.4 sec      Thyroid Stimulating Hormone, Serum: 1.36 uIU/mL (08.31.24 @ 05:30)   12 Lead ECG:   Ventricular Rate 41 BPM    Atrial Rate 241 BPM    QRS Duration 88 ms    Q-T Interval 514 ms    QTC Calculation(Bazett) 424 ms    R Axis 17 degrees    T Axis 8 degrees    Diagnosis Line Atrial fibrillation with slow ventricular response  NonspecificT wave abnormality  Abnormal ECG    Confirmed by Tirso Moraes ([id number]) on 8/30/2024 8:44:51 PM (08-30-24 @ 12:07)      RADIOLOGY & ADDITIONAL TESTS:    < from: TTE Echo Complete w/ Contrast w/o Doppler (08.31.24 @ 07:46) >  Summary:   1. Normal global left ventricular systolic function.   2. Left ventricular ejection fraction, by visual estimation, is 65 to   70%.   3. Moderate concentric left ventricular hypertrophy.   4. Spectral Doppler shows impaired relaxation pattern of left   ventricular myocardial filling (Grade I diastolicdysfunction).   5. Normal right ventricular size and function.   6. Moderately enlarged left atrium.   7. Mildly enlarged right atrium.   8. Moderate aortic valve stenosis (Vmax 3.56 m/s, PG/MG 51/27 mmHg, MATT   1.43 cm2).   9. Moderate aortic regurgitation.  10. Mild tricuspid regurgitation.  11. Estimated pulmonary artery systolic pressure is 35.0 mmHg assuming a   right atrial pressure of 8 mmHg, which is consistent with borderline   pulmonary hypertension.  12. Dilatation of the aortic root (4.3 cm, 2.34 cm/m2) and ascending   aorta (4.4 cm, 2.39 cm/m2).    < end of copied text >     INTERVAL HPI/OVERNIGHT EVENTS:  doing well  off Levo  Dopa is down 2.5, HR in 80s  **Upon discussion with daughter:  on 8/24 pt was discharged from [facility] on  Coreg 6.25mg bid, Diltiazem CD 180mg daily, along with Vasartan and Hydralazine  on 8/29 patient followed up with primary cardiolgist and medications were changed to :  Diltiazem remained the same, changed Coreg 25mg bid, along with Losartan and Procardia all started on Thu PM, and Fri after AM dose patient was bradycardic and hypotensive       MEDICATIONS  (STANDING):  aspirin enteric coated 81 milliGRAM(s) Oral daily  atorvastatin 40 milliGRAM(s) Oral at bedtime  chlorhexidine 2% Cloths 1 Application(s) Topical daily  clopidogrel Tablet 75 milliGRAM(s) Oral daily  finasteride 5 milliGRAM(s) Oral daily  heparin  Infusion. 900 Unit(s)/Hr (9 mL/Hr) IV Continuous <Continuous>  pantoprazole    Tablet 40 milliGRAM(s) Oral before breakfast  sodium bicarbonate 650 milliGRAM(s) Oral every 8 hours    MEDICATIONS  (PRN):  heparin   Injectable 6000 Unit(s) IV Push every 6 hours PRN For aPTT less than 40  heparin   Injectable 3000 Unit(s) IV Push every 6 hours PRN For aPTT between 40 - 57      Allergies    No Known Allergies    Intolerances        REVIEW OF SYSTEMS    [x ] A ten-point review of systems was otherwise negative except as noted.  [ ] Due to altered mental status/intubation, subjective information were not able to be obtained from the patient. History was obtained, to the extent possible, from review of the chart and collateral sources of information.      Vital Signs Last 24 Hrs  T(C): 36.1 (01 Sep 2024 12:00), Max: 36.8 (31 Aug 2024 20:00)  T(F): 97 (01 Sep 2024 12:00), Max: 98.3 (31 Aug 2024 20:00)  HR: 69 (01 Sep 2024 12:00) (63 - 86)  BP: --  BP(mean): --  RR: 21 (01 Sep 2024 12:00) (18 - 29)  SpO2: 100% (01 Sep 2024 12:00) (93% - 100%)    Parameters below as of 01 Sep 2024 12:00  Patient On (Oxygen Delivery Method): room air        08-31-24 @ 07:01  -  09-01-24 @ 07:00  --------------------------------------------------------  IN: 1078.7 mL / OUT: 860 mL / NET: 218.7 mL    09-01-24 @ 07:01  -  09-01-24 @ 13:58  --------------------------------------------------------  IN: 88.3 mL / OUT: 0 mL / NET: 88.3 mL        PHYSICAL EXAM:    GENERAL: In no apparent distress, well nourished, and hydrated.  HEART: IRRegular rate and rhythm; No murmur; NO rubs, or gallops.  PULMONARY: Clear to auscultation and percussion.  Normal expansion/effort. No rales, wheezing, or rhonchi bilaterally.  ABDOMEN: Soft, Nontender, Nondistended; Bowel sounds present  EXTREMITIES:  Extremities warm, pink, well-perfused, 2+ Peripheral Pulses, No clubbing, cyanosis, or edema  NEUROLOGICAL: alert & oriented x 3, no focal deficits, PERRLA, EOMI    LABS:                        10.8   8.17  )-----------( 160      ( 01 Sep 2024 04:20 )             31.6     09-01    133<L>  |  102  |  49<H>  ----------------------------<  123<H>  3.4<L>   |  18  |  3.2<H>    Ca    7.4<L>      01 Sep 2024 04:20  Phos  4.3     09-01  Mg     2.3     09-01    TPro  5.3<L>  /  Alb  3.3<L>  /  TBili  0.5  /  DBili  x   /  AST  26  /  ALT  41  /  AlkPhos  83  09-01    PT/INR - ( 30 Aug 2024 20:38 )   PT: 11.20 sec;   INR: 0.98 ratio         PTT - ( 01 Sep 2024 06:56 )  PTT:56.4 sec      Thyroid Stimulating Hormone, Serum: 1.36 uIU/mL (08.31.24 @ 05:30)   12 Lead ECG:   Ventricular Rate 41 BPM    Atrial Rate 241 BPM    QRS Duration 88 ms    Q-T Interval 514 ms    QTC Calculation(Bazett) 424 ms    R Axis 17 degrees    T Axis 8 degrees    Diagnosis Line Atrial fibrillation with slow ventricular response  NonspecificT wave abnormality  Abnormal ECG    Confirmed by Tirso Moraes ([id number]) on 8/30/2024 8:44:51 PM (08-30-24 @ 12:07)      RADIOLOGY & ADDITIONAL TESTS:    < from: TTE Echo Complete w/ Contrast w/o Doppler (08.31.24 @ 07:46) >  Summary:   1. Normal global left ventricular systolic function.   2. Left ventricular ejection fraction, by visual estimation, is 65 to   70%.   3. Moderate concentric left ventricular hypertrophy.   4. Spectral Doppler shows impaired relaxation pattern of left   ventricular myocardial filling (Grade I diastolicdysfunction).   5. Normal right ventricular size and function.   6. Moderately enlarged left atrium.   7. Mildly enlarged right atrium.   8. Moderate aortic valve stenosis (Vmax 3.56 m/s, PG/MG 51/27 mmHg, MATT   1.43 cm2).   9. Moderate aortic regurgitation.  10. Mild tricuspid regurgitation.  11. Estimated pulmonary artery systolic pressure is 35.0 mmHg assuming a   right atrial pressure of 8 mmHg, which is consistent with borderline   pulmonary hypertension.  12. Dilatation of the aortic root (4.3 cm, 2.34 cm/m2) and ascending   aorta (4.4 cm, 2.39 cm/m2).    < end of copied text >

## 2024-09-01 NOTE — PROGRESS NOTE ADULT - SUBJECTIVE AND OBJECTIVE BOX
CCU Attending Note    STEPHANIE RAPP  MRN-[id number]    Date of Admission: 08-30-24    Brief HPI: 89-year-old male past medical history CAD status post recent PCI, hypertension, hyperlipidemia, A-fib, CKD stage IIIb who presents with symptomatic bradycardia.    24 hr events: NAEON    aspirin enteric coated 81 milliGRAM(s) Oral daily  atorvastatin 20 milliGRAM(s) Oral at bedtime  chlorhexidine 2% Cloths 1 Application(s) Topical daily  clopidogrel Tablet 75 milliGRAM(s) Oral daily  DOPamine Infusion 7.5 MICROgram(s)/kG/Min IV Continuous <Continuous>  DOPamine Infusion 2 MICROgram(s)/kG/Min IV Continuous <Continuous>  finasteride 5 milliGRAM(s) Oral daily  heparin   Injectable 3000 Unit(s) IV Push every 6 hours PRN  heparin   Injectable 6000 Unit(s) IV Push every 6 hours PRN  heparin  Infusion.  Unit(s)/Hr IV Continuous <Continuous>  norepinephrine Infusion 0.05 MICROgram(s)/kG/Min IV Continuous <Continuous>  pantoprazole    Tablet 40 milliGRAM(s) Oral before breakfast  sodium bicarbonate 650 milliGRAM(s) Oral every 8 hours      PHYSICAL EXAM:  Vitals reviewed    General Appearance: no acute distress	  Cardiovascular: regular rate and rhythm S1 S2, No JVP  Respiratory: clear bilaterally  Gastrointestinal:  soft, non-tender  Skin/Integumen: no edema  Neurologic: non focal  Musculoskeletal/ extremities: warm  Vascular: Peripheral pulses palpable 2+ bilaterally    LABS: Reviewed    CARDIAC TESTING:  ECG:  	  Telemetry: AF  Echocardiogram: LV systolic function normal  Coronary angiogram:  RHC:  Stress test:  cMRI:    OTHER DIAGNOSTIC TESTING:  CXR: no acute pahtology   CT:   CCU Attending Note    STEPHANIE RAPP  MRN-[id number]    Date of Admission: 08-30-24    Brief HPI: 89-year-old male past medical history CAD status post recent PCI, hypertension, hyperlipidemia, A-fib, CKD stage IIIb who presents with symptomatic bradycardia.    24 hr events: NAEON    aspirin enteric coated 81 milliGRAM(s) Oral daily  atorvastatin 20 milliGRAM(s) Oral at bedtime  chlorhexidine 2% Cloths 1 Application(s) Topical daily  clopidogrel Tablet 75 milliGRAM(s) Oral daily  DOPamine Infusion 7.5 MICROgram(s)/kG/Min IV Continuous <Continuous>  DOPamine Infusion 2 MICROgram(s)/kG/Min IV Continuous <Continuous>  finasteride 5 milliGRAM(s) Oral daily  heparin   Injectable 3000 Unit(s) IV Push every 6 hours PRN  heparin   Injectable 6000 Unit(s) IV Push every 6 hours PRN  heparin  Infusion.  Unit(s)/Hr IV Continuous <Continuous>  norepinephrine Infusion 0.05 MICROgram(s)/kG/Min IV Continuous <Continuous>  pantoprazole    Tablet 40 milliGRAM(s) Oral before breakfast  sodium bicarbonate 650 milliGRAM(s) Oral every 8 hours      PHYSICAL EXAM:  Vitals reviewed    General Appearance: no acute distress	  Cardiovascular: regular rate and rhythm S1 S2, No JVP  Respiratory: clear bilaterally  Gastrointestinal:  soft, non-tender  Skin/Integumen: no edema  Neurologic: non focal  Musculoskeletal/ extremities: warm  Vascular: Peripheral pulses palpable 2+ bilaterally    LABS: Reviewed    CARDIAC TESTING:  ECG:  	  Telemetry: AF  Echocardiogram: LV systolic function normal  Coronary angiogram:  RHC:  Stress test:  cMRI:    OTHER DIAGNOSTIC TESTING:  CXR: no acute pathology  CT:

## 2024-09-01 NOTE — PROGRESS NOTE ADULT - ASSESSMENT
88yo Make with HTN, HLD, CKD3, mod AS, CAD, PCI 8/23, chronic AF admitted in junctional bradycardia  likely medications induced  rates improving  off pressors     chronic AF  CAD  HTN  CKD  mod AS    con't tele  weanoff Dopamine  restart low dose beta blockers as recommended for CAD management  limit to only one AVN blocker, hold off Diltiazem for now  monitor for tachy-ramila once bb restarted   TSH noted  Maintain electrolytes K>4.0 Mg >2.0   will follow

## 2024-09-01 NOTE — PROGRESS NOTE ADULT - SUBJECTIVE AND OBJECTIVE BOX
Patient is a 89y old  Male who presents with a chief complaint of Bradycardia (30 Aug 2024 19:05)    HPI:  89M, Cantonese speaking, with PMHx:  - persistent AF (on Eliquis),   - HTN,   - HLD,   - CKD Stage 3b (unclear baseline),   - thoracic aortic ectasia,   - BPH,   - vitamin D deficiency,   - chronic hepatitis B,   - CAD and severe low flow, low gradient AS:   s/p Left and right Heart cath on 24: p-mRCA 80-95% s/p rota and WOLF x2; dLM 30%; LAD 80%, mLAD 90%; D1 mod diffuse, D2 mod diffuse; LCx heavily calcified 80% diffuse; OM2 anio FFR 0.72. s/p RHC 24: RA 13; RV 31/10; PA 38/23 (29); PCWP 19; CO / CI 3.13 / 1.8; AoSat 98/8%; PaSat 68.2%; dopamine 1mcg / kg / min for AV valve studypeak to peak ratio 10.4mmHg; mean 13.75mmHg; MATT 0.91cm; SV 22.63.   - TTE 24: EF 55-60% mild concentric LVH; no RWMA; LA severely dilated; severe AS; mean 19.7; MATT 0.7; V max 2.95    Patient woke up today very lethargic and weak .  Family called EMS; on their arrival, he was bradycardic to the 30s and with unobtainable blood pressure upon arrival.   Given 1 of atropine and 250 cc bolus with some improvement.    He was transcutaneously paced en route.   on arrival to the emergency department he was bradycardic and hypotensive.   Given that he didn't improve after atropine, he was started on dopamine and levophed; and was given glucagon.     Patient was recently discharged on coreg 6.25 BID and Cardizem 180, however on Wednesday, meds were adjusted by his cardiologist in [facility] Dr Scott to carvedilol 25 mg BID and Nifedipine 90 mg XL OD   Also hydralazine 10 mg and valsartan 40 mg that he was discharged on were switched to losartan 100 mg OD   (30 Aug 2024 15:38)       INTERVAL HPI/OVERNIGHT EVENTS:   No overnight events   Afebrile, hemodynamically stable     Subjective:    ICU Vital Signs Last 24 Hrs  T(C): 36.1 (31 Aug 2024 16:00), Max: 36.2 (31 Aug 2024 08:00)  T(F): 97 (31 Aug 2024 16:00), Max: 97.1 (31 Aug 2024 08:00)  HR: 80 (31 Aug 2024 17:00) (65 - 97)  BP: --  BP(mean): --  ABP: 121/64 (31 Aug 2024 17:00) (94/53 - 127/72)  ABP(mean): 85 (31 Aug 2024 17:00) (67 - 90)  RR: 23 (31 Aug 2024 17:00) (16 - 26)  SpO2: 96% (31 Aug 2024 17:00) (92% - 99%)    O2 Parameters below as of 31 Aug 2024 17:00  Patient On (Oxygen Delivery Method): room air          I&O's Summary    30 Aug 2024 07:01  -  31 Aug 2024 07:00  --------------------------------------------------------  IN: 1248.8 mL / OUT: 0 mL / NET: 1248.8 mL    31 Aug 2024 07:01  -  31 Aug 2024 17:33  --------------------------------------------------------  IN: 620.8 mL / OUT: 0 mL / NET: 620.8 mL          Daily Height in cm: 170.18 (31 Aug 2024 00:00)    Daily     Adult Advanced Hemodynamics Last 24 Hrs  CVP(mm Hg): --  CVP(cm H2O): --  CO: --  CI: --  PA: --  PA(mean): --  PCWP: --  SVR: --  SVRI: --  PVR: --  PVRI: --    EKG/Telemetry Events:    MEDICATIONS  (STANDING):  aspirin enteric coated 81 milliGRAM(s) Oral daily  atorvastatin 40 milliGRAM(s) Oral at bedtime  chlorhexidine 2% Cloths 1 Application(s) Topical daily  clopidogrel Tablet 75 milliGRAM(s) Oral daily  DOPamine Infusion 5 MICROgram(s)/kG/Min (13.6 mL/Hr) IV Continuous <Continuous>  finasteride 5 milliGRAM(s) Oral daily  heparin  Infusion. 900 Unit(s)/Hr (9 mL/Hr) IV Continuous <Continuous>  norepinephrine Infusion 0.05 MICROgram(s)/kG/Min (3.41 mL/Hr) IV Continuous <Continuous>  pantoprazole    Tablet 40 milliGRAM(s) Oral before breakfast  sodium bicarbonate 650 milliGRAM(s) Oral every 8 hours    MEDICATIONS  (PRN):  heparin   Injectable 6000 Unit(s) IV Push every 6 hours PRN For aPTT less than 40  heparin   Injectable 3000 Unit(s) IV Push every 6 hours PRN For aPTT between 40 - 57      PHYSICAL EXAM:  GENERAL:   HEAD:  Atraumatic, Normocephalic  EYES: EOMI, PERRLA, conjunctiva and sclera clear  NECK: Supple, No JVD, Normal thyroid, no enlarged nodes  NERVOUS SYSTEM:  Alert & Awake.   CHEST/LUNG: B/L good air entry; No rales, rhonchi, or wheezing  HEART: S1S2 normal, no S3, Regular rate and rhythm; No murmurs  ABDOMEN: Soft, Nontender, Nondistended; Bowel sounds present  EXTREMITIES:  2+ Peripheral Pulses, No clubbing, cyanosis, or edema  LYMPH: No lymphadenopathy noted  SKIN: No rashes or lesions    LABS:                        12.0   10.46 )-----------( 206      ( 31 Aug 2024 16:42 )             35.4         134<L>  |  102  |  42<H>  ----------------------------<  185<H>  4.3   |  14<L>  |  3.1<H>    Ca    8.0<L>      31 Aug 2024 05:30  Phos  4.3       Mg     2.2         TPro  6.3  /  Alb  3.8  /  TBili  0.8  /  DBili  x   /  AST  46<H>  /  ALT  62<H>  /  AlkPhos  111  08-31    LIVER FUNCTIONS - ( 31 Aug 2024 05:30 )  Alb: 3.8 g/dL / Pro: 6.3 g/dL / ALK PHOS: 111 U/L / ALT: 62 U/L / AST: 46 U/L / GGT: x           PT/INR - ( 30 Aug 2024 20:38 )   PT: 11.20 sec;   INR: 0.98 ratio         PTT - ( 31 Aug 2024 16:42 )  PTT:113.1 sec  CAPILLARY BLOOD GLUCOSE      POCT Blood Glucose.: 201 mg/dL (30 Aug 2024 23:47)    ABG - ( 30 Aug 2024 19:15 )  pH, Arterial: 7.33  pH, Blood: x     /  pCO2: 31    /  pO2: 133   / HCO3: 16    / Base Excess: -8.4  /  SaO2: 99.7                    Urinalysis Basic - ( 31 Aug 2024 17:13 )    Color: Dark Yellow / Appearance: Cloudy / S.018 / pH: x  Gluc: x / Ketone: Trace mg/dL  / Bili: Negative / Urobili: 1.0 mg/dL   Blood: x / Protein: 300 mg/dL / Nitrite: Negative   Leuk Esterase: Moderate / RBC: x / WBC x   Sq Epi: x / Non Sq Epi: x / Bacteria: x          RADIOLOGY & ADDITIONAL TESTS:  CXR:        Care Discussed with Consultants/Other Providers [ x] YES  [ ] NO

## 2024-09-02 LAB
ALBUMIN SERPL ELPH-MCNC: 3 G/DL — LOW (ref 3.5–5.2)
ALP SERPL-CCNC: 81 U/L — SIGNIFICANT CHANGE UP (ref 30–115)
ALT FLD-CCNC: 34 U/L — SIGNIFICANT CHANGE UP (ref 0–41)
ANION GAP SERPL CALC-SCNC: 14 MMOL/L — SIGNIFICANT CHANGE UP (ref 7–14)
APTT BLD: 119.2 SEC — CRITICAL HIGH (ref 27–39.2)
APTT BLD: 94.8 SEC — CRITICAL HIGH (ref 27–39.2)
AST SERPL-CCNC: 25 U/L — SIGNIFICANT CHANGE UP (ref 0–41)
BASOPHILS # BLD AUTO: 0.03 K/UL — SIGNIFICANT CHANGE UP (ref 0–0.2)
BASOPHILS NFR BLD AUTO: 0.4 % — SIGNIFICANT CHANGE UP (ref 0–1)
BILIRUB SERPL-MCNC: 0.6 MG/DL — SIGNIFICANT CHANGE UP (ref 0.2–1.2)
BUN SERPL-MCNC: 40 MG/DL — HIGH (ref 10–20)
CALCIUM SERPL-MCNC: 7.7 MG/DL — LOW (ref 8.4–10.4)
CHLORIDE SERPL-SCNC: 104 MMOL/L — SIGNIFICANT CHANGE UP (ref 98–110)
CO2 SERPL-SCNC: 15 MMOL/L — LOW (ref 17–32)
CREAT SERPL-MCNC: 2.6 MG/DL — HIGH (ref 0.7–1.5)
CULTURE RESULTS: SIGNIFICANT CHANGE UP
EGFR: 23 ML/MIN/1.73M2 — LOW
EOSINOPHIL # BLD AUTO: 0.12 K/UL — SIGNIFICANT CHANGE UP (ref 0–0.7)
EOSINOPHIL NFR BLD AUTO: 1.6 % — SIGNIFICANT CHANGE UP (ref 0–8)
GLUCOSE SERPL-MCNC: 96 MG/DL — SIGNIFICANT CHANGE UP (ref 70–99)
HCT VFR BLD CALC: 33.6 % — LOW (ref 42–52)
HGB BLD-MCNC: 11.4 G/DL — LOW (ref 14–18)
IMM GRANULOCYTES NFR BLD AUTO: 0.3 % — SIGNIFICANT CHANGE UP (ref 0.1–0.3)
LYMPHOCYTES # BLD AUTO: 1.37 K/UL — SIGNIFICANT CHANGE UP (ref 1.2–3.4)
LYMPHOCYTES # BLD AUTO: 18.3 % — LOW (ref 20.5–51.1)
MAGNESIUM SERPL-MCNC: 2.3 MG/DL — SIGNIFICANT CHANGE UP (ref 1.8–2.4)
MCHC RBC-ENTMCNC: 31.4 PG — HIGH (ref 27–31)
MCHC RBC-ENTMCNC: 33.9 G/DL — SIGNIFICANT CHANGE UP (ref 32–37)
MCV RBC AUTO: 92.6 FL — SIGNIFICANT CHANGE UP (ref 80–94)
MONOCYTES # BLD AUTO: 0.74 K/UL — HIGH (ref 0.1–0.6)
MONOCYTES NFR BLD AUTO: 9.9 % — HIGH (ref 1.7–9.3)
NEUTROPHILS # BLD AUTO: 5.19 K/UL — SIGNIFICANT CHANGE UP (ref 1.4–6.5)
NEUTROPHILS NFR BLD AUTO: 69.5 % — SIGNIFICANT CHANGE UP (ref 42.2–75.2)
NRBC # BLD: 0 /100 WBCS — SIGNIFICANT CHANGE UP (ref 0–0)
PHOSPHATE SERPL-MCNC: 3.4 MG/DL — SIGNIFICANT CHANGE UP (ref 2.1–4.9)
PLATELET # BLD AUTO: 168 K/UL — SIGNIFICANT CHANGE UP (ref 130–400)
PMV BLD: 10.8 FL — HIGH (ref 7.4–10.4)
POTASSIUM SERPL-MCNC: 4.2 MMOL/L — SIGNIFICANT CHANGE UP (ref 3.5–5)
POTASSIUM SERPL-SCNC: 4.2 MMOL/L — SIGNIFICANT CHANGE UP (ref 3.5–5)
PROT SERPL-MCNC: 5.2 G/DL — LOW (ref 6–8)
RBC # BLD: 3.63 M/UL — LOW (ref 4.7–6.1)
RBC # FLD: 13.3 % — SIGNIFICANT CHANGE UP (ref 11.5–14.5)
SODIUM SERPL-SCNC: 133 MMOL/L — LOW (ref 135–146)
SPECIMEN SOURCE: SIGNIFICANT CHANGE UP
WBC # BLD: 7.47 K/UL — SIGNIFICANT CHANGE UP (ref 4.8–10.8)
WBC # FLD AUTO: 7.47 K/UL — SIGNIFICANT CHANGE UP (ref 4.8–10.8)

## 2024-09-02 PROCEDURE — 93010 ELECTROCARDIOGRAM REPORT: CPT

## 2024-09-02 PROCEDURE — 99291 CRITICAL CARE FIRST HOUR: CPT

## 2024-09-02 RX ORDER — HYDRALAZINE HCL 50 MG
25 TABLET ORAL THREE TIMES A DAY
Refills: 0 | Status: DISCONTINUED | OUTPATIENT
Start: 2024-09-02 | End: 2024-09-03

## 2024-09-02 RX ORDER — NIFEDIPINE 60 MG/1
90 TABLET, FILM COATED, EXTENDED RELEASE ORAL DAILY
Refills: 0 | Status: DISCONTINUED | OUTPATIENT
Start: 2024-09-03 | End: 2024-09-04

## 2024-09-02 RX ORDER — CARVEDILOL 6.25 MG/1
6.25 TABLET ORAL EVERY 12 HOURS
Refills: 0 | Status: DISCONTINUED | OUTPATIENT
Start: 2024-09-02 | End: 2024-09-04

## 2024-09-02 RX ORDER — NIFEDIPINE 60 MG/1
60 TABLET, FILM COATED, EXTENDED RELEASE ORAL ONCE
Refills: 0 | Status: COMPLETED | OUTPATIENT
Start: 2024-09-02 | End: 2024-09-02

## 2024-09-02 RX ADMIN — Medication 81 MILLIGRAM(S): at 13:46

## 2024-09-02 RX ADMIN — SODIUM BICARBONATE 650 MILLIGRAM(S): 84 INJECTION, SOLUTION INTRAVENOUS at 22:04

## 2024-09-02 RX ADMIN — Medication 25 MILLIGRAM(S): at 13:47

## 2024-09-02 RX ADMIN — Medication 40 MILLIGRAM(S): at 05:23

## 2024-09-02 RX ADMIN — Medication 700 UNIT(S)/HR: at 05:38

## 2024-09-02 RX ADMIN — SODIUM BICARBONATE 650 MILLIGRAM(S): 84 INJECTION, SOLUTION INTRAVENOUS at 05:24

## 2024-09-02 RX ADMIN — Medication 700 UNIT(S)/HR: at 18:06

## 2024-09-02 RX ADMIN — CARVEDILOL 6.25 MILLIGRAM(S): 6.25 TABLET ORAL at 17:56

## 2024-09-02 RX ADMIN — Medication 25 MILLIGRAM(S): at 00:30

## 2024-09-02 RX ADMIN — CHLORHEXIDINE GLUCONATE 1 APPLICATION(S): 40 SOLUTION TOPICAL at 13:46

## 2024-09-02 RX ADMIN — Medication 25 MILLIGRAM(S): at 22:04

## 2024-09-02 RX ADMIN — FINASTERIDE 5 MILLIGRAM(S): 1 TABLET, FILM COATED ORAL at 15:23

## 2024-09-02 RX ADMIN — NIFEDIPINE 30 MILLIGRAM(S): 60 TABLET, FILM COATED, EXTENDED RELEASE ORAL at 05:24

## 2024-09-02 RX ADMIN — Medication 40 MILLIGRAM(S): at 22:04

## 2024-09-02 RX ADMIN — SODIUM BICARBONATE 650 MILLIGRAM(S): 84 INJECTION, SOLUTION INTRAVENOUS at 13:47

## 2024-09-02 RX ADMIN — Medication 25 MILLIGRAM(S): at 00:27

## 2024-09-02 RX ADMIN — Medication 25 MILLIGRAM(S): at 05:24

## 2024-09-02 RX ADMIN — Medication 75 MILLIGRAM(S): at 13:47

## 2024-09-02 RX ADMIN — CARVEDILOL 6.25 MILLIGRAM(S): 6.25 TABLET ORAL at 09:23

## 2024-09-02 RX ADMIN — NIFEDIPINE 60 MILLIGRAM(S): 60 TABLET, FILM COATED, EXTENDED RELEASE ORAL at 06:23

## 2024-09-02 NOTE — PROGRESS NOTE ADULT - ASSESSMENT
89-year-old male past medical history CAD status post recent PCI, hypertension, hyperlipidemia, A-fib, CKD stage IIIb who presents with symptomatic bradycardia.      IMPRESSION:    #Symptomatic unstable Afib with Slow VR   #baseline persistent AF   #CAD s/p [facility] 8/23/24: p-mRCA 80-95% s/p rota and WOLF x2; and planned for staged PCI of LAD / LCx   #severe low flow, low gradient AS  #HTN  #ALESSANDRA on CKD Stage 3b (unclear baseline)      PLAN:    CNS: AO3, no need for sedation. CTH negative.    PULMONARY: On room air saturating at 100%. HOB @ 45. Monitor Pulse Ox. Keep > 93%. Supplement as needed.    CARDIOVASCULAR:   -off levo and dopa  -Continue atorvastatin 40 mg  -Continue DAPT (ASA/Plavix)  -Continue heparin gtt for AF  -restart coreg 6.25mg q12h  - c/w nifedipine 90mg  - c/w hydralazine 25mg q8h    GI:   - GI prophylaxis.   - S&S eval: regular diet    RENAL:   - Monitor BMP/Cr  - Avoid nephrotoxic agents   - ALESSANDRA -> cr 2.6. improving  - c/w sodium bicarb 650 TID to target bicarb 22; (now it is 15)  - f up repeat BMP ( to replete K+ and calcium)     INFECTIOUS DISEASE:   - Monitor WBC  - no signs or symptoms of infection    HEMATOLOGICAL:   - Monitor H&H, Keep active T&S  - heparin drip instead of eliquis    ENDOCRINE:   - recent A1c within normal limits   - f up TSH    MUSCULOSKELETAL:   - increase as tolerated   - hematoma at the level of the left thigh -> monitor to assess improvement    Lines: Right IJ central line & Left radial A line taken out on 9/2  CODE STATUS: full    DISPOSITION: CCU

## 2024-09-02 NOTE — PROGRESS NOTE ADULT - NS ATTEND AMEND GEN_ALL_CORE FT
complex patient  HR ~100 bpm today  wean off Dopamine  reintroduce low dose BB  MCOT at discharge
complex patient  Off dopamine  Coreg 6.25 well tolerated  No indication for pacemaker at this time  MCOT on discharge  agree with above

## 2024-09-02 NOTE — PROGRESS NOTE ADULT - ASSESSMENT
89-year-old male past medical history CAD status post recent PCI, hypertension, hyperlipidemia, A-fib, CKD stage IIIb who presents with symptomatic bradycardia.     Patient recently had antiarrhythmics uptitrated to carvedilol 25 mg twice daily.  Upon arrival to ED patient was noted to have heart rate in the 30s and hypotensive.  Patient was started on Levophed and dopamine and transferred to CCU.    Labs reviewed notable for leukoctyosios improving.  Creatinine ~3 but patient with known CKD stage IIIb.  Echo reviewed with normal LV systolic function.  Patient with known CAD and recently received PCI to RCA in August at [facility].  He continues to be on aspirin/Plavix/heparin gtt.  Continue atorvastatin to 40 mg daily for uncontrolled lipids.    On exam he is warm, well-perfused, and euvolemic.  He is currently supported on dopamine with heart rates in the 90s.  We will wean dopamine as tolerated and if tolerable restart small dose of antinodal agent tomorrow.  Etiology of symptomatic bradycardia was most likely due to overmedication. However if patient continues to have symptomatic bradycardia then may benefit from a pacemaker.  EP is on board.    Plan  -Wean dopamine   -Continue atorvastatin to 40 mg  -Continue ASA/Plavix  -Continue heparin gtt for AF    Wilbert Michael MD  Interventional Cardiology/Advanced Heart Failure Transplant   89-year-old male past medical history CAD status post recent PCI, hypertension, hyperlipidemia, A-fib, CKD stage IIIb who presents with symptomatic bradycardia.     Patient recently had antiarrhythmics uptitrated to carvedilol 25 mg twice daily.  Upon arrival to ED patient was noted to have heart rate in the 30s and hypotensive.  Patient was started on Levophed and dopamine and transferred to CCU.    Labs reviewed notable for leukocytosis improving.  Creatinine ~3 but patient with known CKD stage IIIb, baseline ~1.7. Cre improving.   He continues to be on aspirin/Plavix/heparin gtt for recent PCI and AF.  Continue atorvastatin to 40 mg daily for uncontrolled lipids.    On exam he is warm, well-perfused, and euvolemic.  Now off dopamine and will restart low dose coreg.  Etiology of symptomatic bradycardia was most likely due to overmedication. However if patient continues to have symptomatic bradycardia then may benefit from a pacemaker.  EP is on board.    Plan  -Restart coreg 6.25mg BID   -Continue hydralazine 25 mg TID  -Continue nifedipine 90 mg  -Continue atorvastatin to 40 mg  -Continue ASA/Plavix  -Continue heparin gtt for AF    Wilbert Michael MD  Interventional Cardiology/Advanced Heart Failure Transplant   89-year-old male past medical history CAD status post recent PCI, hypertension, hyperlipidemia, A-fib, CKD stage IIIb who presents with symptomatic bradycardia.     Patient recently had antiarrhythmics uptitrated to carvedilol 25 mg twice daily.  Upon arrival to ED patient was noted to have heart rate in the 30s and hypotensive.  Patient was started on Levophed and dopamine and transferred to CCU.    Labs reviewed notable for leukocytosis improving.  Creatinine ~3 but patient with known CKD stage IIIb, baseline ~1.7. Cre improving.   He continues to be on aspirin/Plavix/heparin gtt for recent PCI and AF.  Continue atorvastatin to 40 mg daily for uncontrolled lipids.    On exam he is warm, well-perfused, and euvolemic.  Now off dopamine and will restart low dose coreg.  Etiology of symptomatic bradycardia was most likely due to overmedication. However if patient continues to have symptomatic bradycardia then may benefit from a pacemaker. He continues to have difficult control HTN and will continue uptitration of antihypertensives as his kidney function continues to improve.    Plan  -Remove A line and CVL  -Restart coreg 6.25mg BID   -Continue hydralazine 25 mg TID  -Continue nifedipine 90 mg  -Continue atorvastatin to 40 mg  -Continue ASA/Plavix  -Continue heparin gtt for AF    Wilbert Michael MD  Interventional Cardiology/Advanced Heart Failure Transplant

## 2024-09-02 NOTE — PROGRESS NOTE ADULT - ASSESSMENT
88yo Make with HTN, HLD, CKD3, mod AS, CAD, PCI 8/23, chronic AF admitted in junctional bradycardia  likely medications induced  rates improving  off pressors and Doparine  restarted on Coreg 6.25 bid     chronic AF  CAD  HTN  CKD  mod AS    con't tele  con't Coreg (*beta blockers as recommended for CAD management)  monitor for bradycardia  limit to only one AVN blocker, hold off Diltiazem for now  Maintain electrolytes K>4.0 Mg >2.0   will follow

## 2024-09-02 NOTE — PROGRESS NOTE ADULT - TIME BILLING
1st recall letter will be sent week of 8/19-8/24/24 .     When ready to schedule please schedule off of the recall. Thank you.   
AF, bradycarrhythmias
AF, bradyarrhythmias

## 2024-09-02 NOTE — PROGRESS NOTE ADULT - SUBJECTIVE AND OBJECTIVE BOX
CCU Attending Note    STEPHANIE RAPP  MRN-[id number]    Date of Admission: 08-30-24    Brief HPI: 89-year-old male past medical history CAD status post recent PCI, hypertension, hyperlipidemia, A-fib, CKD stage IIIb who presents with symptomatic bradycardia.    24 hr events: NAEON    aspirin enteric coated 81 milliGRAM(s) Oral daily  atorvastatin 20 milliGRAM(s) Oral at bedtime  chlorhexidine 2% Cloths 1 Application(s) Topical daily  clopidogrel Tablet 75 milliGRAM(s) Oral daily  DOPamine Infusion 7.5 MICROgram(s)/kG/Min IV Continuous <Continuous>  DOPamine Infusion 2 MICROgram(s)/kG/Min IV Continuous <Continuous>  finasteride 5 milliGRAM(s) Oral daily  heparin   Injectable 3000 Unit(s) IV Push every 6 hours PRN  heparin   Injectable 6000 Unit(s) IV Push every 6 hours PRN  heparin  Infusion.  Unit(s)/Hr IV Continuous <Continuous>  norepinephrine Infusion 0.05 MICROgram(s)/kG/Min IV Continuous <Continuous>  pantoprazole    Tablet 40 milliGRAM(s) Oral before breakfast  sodium bicarbonate 650 milliGRAM(s) Oral every 8 hours      PHYSICAL EXAM:  Vitals reviewed    General Appearance: no acute distress	  Cardiovascular: regular rate and rhythm S1 S2, No JVP  Respiratory: clear bilaterally  Gastrointestinal:  soft, non-tender  Skin/Integumen: no edema  Neurologic: non focal  Musculoskeletal/ extremities: warm  Vascular: Peripheral pulses palpable 2+ bilaterally    LABS: Reviewed    CARDIAC TESTING:  ECG:  	  Telemetry: AF  Echocardiogram: LV systolic function normal  Coronary angiogram:  RHC:  Stress test:  cMRI:    OTHER DIAGNOSTIC TESTING:  CXR: no acute pathology  CT:

## 2024-09-02 NOTE — PROGRESS NOTE ADULT - SUBJECTIVE AND OBJECTIVE BOX
INTERVAL HPI/OVERNIGHT EVENTS:   off Dopa since 1pm yesterday  Careg 6.25 was started today, tolerating    MEDICATIONS  (STANDING):  aspirin enteric coated 81 milliGRAM(s) Oral daily  atorvastatin 40 milliGRAM(s) Oral at bedtime  carvedilol 6.25 milliGRAM(s) Oral every 12 hours  chlorhexidine 2% Cloths 1 Application(s) Topical daily  clopidogrel Tablet 75 milliGRAM(s) Oral daily  finasteride 5 milliGRAM(s) Oral daily  heparin  Infusion. 900 Unit(s)/Hr (9 mL/Hr) IV Continuous <Continuous>  hydrALAZINE 25 milliGRAM(s) Oral three times a day  pantoprazole    Tablet 40 milliGRAM(s) Oral before breakfast  sodium bicarbonate 650 milliGRAM(s) Oral every 8 hours    MEDICATIONS  (PRN):  heparin   Injectable 6000 Unit(s) IV Push every 6 hours PRN For aPTT less than 40  heparin   Injectable 3000 Unit(s) IV Push every 6 hours PRN For aPTT between 40 - 57      Allergies    No Known Allergies    Intolerances        REVIEW OF SYSTEMS    [x ] A ten-point review of systems was otherwise negative except as noted.  [ ] Due to altered mental status/intubation, subjective information were not able to be obtained from the patient. History was obtained, to the extent possible, from review of the chart and collateral sources of information.      Vital Signs Last 24 Hrs  T(C): 36.8 (02 Sep 2024 04:00), Max: 36.8 (02 Sep 2024 04:00)  T(F): 98.3 (02 Sep 2024 04:00), Max: 98.3 (02 Sep 2024 04:00)  HR: 75 (02 Sep 2024 14:00) (66 - 97)  BP: 161/81 (01 Sep 2024 18:49) (161/81 - 161/81)  BP(mean): --  RR: 18 (02 Sep 2024 14:00) (18 - 42)  SpO2: 100% (02 Sep 2024 14:00) (95% - 100%)    Parameters below as of 02 Sep 2024 07:00  Patient On (Oxygen Delivery Method): nasal cannula  O2 Flow (L/min): 2      09-01-24 @ 07:01  -  09-02-24 @ 07:00  --------------------------------------------------------  IN: 1212.3 mL / OUT: 1577 mL / NET: -364.7 mL    09-02-24 @ 07:01  -  09-02-24 @ 14:25  --------------------------------------------------------  IN: 0 mL / OUT: 450 mL / NET: -450 mL        PHYSICAL EXAM:    GENERAL: In no apparent distress, well nourished, and hydrated.  HEART: Regular rate and rhythm; No murmur; NO rubs, or gallops.  PULMONARY: Clear to auscultation and percussion.  Normal expansion/effort. No rales, wheezing, or rhonchi bilaterally.  ABDOMEN: Soft, Nontender, Nondistended; Bowel sounds present  EXTREMITIES:  Extremities warm, pink, well-perfused, 2+ Peripheral Pulses, No clubbing, cyanosis, or edema  NEUROLOGICAL: alert & oriented x 3, no focal deficits, PERRLA, EOMI    LABS:                        11.4   7.47  )-----------( 168      ( 02 Sep 2024 04:50 )             33.6     09-02    133<L>  |  104  |  40<H>  ----------------------------<  96  4.2   |  15<L>  |  2.6<H>    Ca    7.7<L>      02 Sep 2024 04:50  Phos  3.4     09-02  Mg     2.3     09-02    TPro  5.2<L>  /  Alb  3.0<L>  /  TBili  0.6  /  DBili  x   /  AST  25  /  ALT  34  /  AlkPhos  81  09-02    PTT - ( 02 Sep 2024 04:50 )  PTT:119.2 sec        12 Lead ECG:   Ventricular Rate 63 BPM    Atrial Rate 75 BPM    QRS Duration 96 ms    Q-T Interval 442 ms    QTC Calculation(Bazett) 452 ms    R Axis 8 degrees    T Axis -56 degrees    Diagnosis Line Atrial fibrillation  Nonspecific ST and T wave abnormality  Abnormal ECG    Confirmed by Tirso Moraes ([id number]) on 9/1/2024 6:48:27 AM (09-01-24 @ 05:58)  12 Lead ECG:   Ventricular Rate 95 BPM    Atrial Rate 95 BPM    P-R Interval 144 ms    QRS Duration 94 ms    Q-T Interval 346 ms    QTC Calculation(Bazett) 434 ms    P Axis -16 degrees    R Axis -8 degrees    T Axis -24 degrees    Diagnosis Line Normal sinus rhythm  Nonspecific ST and T wave abnormality  Abnormal ECG    Confirmed by Tirso Moraes ([id number]) on 8/31/2024 5:35:07 PM (08-31-24 @ 06:21)      RADIOLOGY & ADDITIONAL TESTS:       INTERVAL HPI/OVERNIGHT EVENTS:   off Dopa since 1pm yesterday  Coreg 6.25 was started today, tolerating    MEDICATIONS  (STANDING):  aspirin enteric coated 81 milliGRAM(s) Oral daily  atorvastatin 40 milliGRAM(s) Oral at bedtime  carvedilol 6.25 milliGRAM(s) Oral every 12 hours  chlorhexidine 2% Cloths 1 Application(s) Topical daily  clopidogrel Tablet 75 milliGRAM(s) Oral daily  finasteride 5 milliGRAM(s) Oral daily  heparin  Infusion. 900 Unit(s)/Hr (9 mL/Hr) IV Continuous <Continuous>  hydrALAZINE 25 milliGRAM(s) Oral three times a day  pantoprazole    Tablet 40 milliGRAM(s) Oral before breakfast  sodium bicarbonate 650 milliGRAM(s) Oral every 8 hours    MEDICATIONS  (PRN):  heparin   Injectable 6000 Unit(s) IV Push every 6 hours PRN For aPTT less than 40  heparin   Injectable 3000 Unit(s) IV Push every 6 hours PRN For aPTT between 40 - 57      Allergies    No Known Allergies    Intolerances        REVIEW OF SYSTEMS    [x ] A ten-point review of systems was otherwise negative except as noted.  [ ] Due to altered mental status/intubation, subjective information were not able to be obtained from the patient. History was obtained, to the extent possible, from review of the chart and collateral sources of information.      Vital Signs Last 24 Hrs  T(C): 36.8 (02 Sep 2024 04:00), Max: 36.8 (02 Sep 2024 04:00)  T(F): 98.3 (02 Sep 2024 04:00), Max: 98.3 (02 Sep 2024 04:00)  HR: 75 (02 Sep 2024 14:00) (66 - 97)  BP: 161/81 (01 Sep 2024 18:49) (161/81 - 161/81)  BP(mean): --  RR: 18 (02 Sep 2024 14:00) (18 - 42)  SpO2: 100% (02 Sep 2024 14:00) (95% - 100%)    Parameters below as of 02 Sep 2024 07:00  Patient On (Oxygen Delivery Method): nasal cannula  O2 Flow (L/min): 2      09-01-24 @ 07:01  -  09-02-24 @ 07:00  --------------------------------------------------------  IN: 1212.3 mL / OUT: 1577 mL / NET: -364.7 mL    09-02-24 @ 07:01  -  09-02-24 @ 14:25  --------------------------------------------------------  IN: 0 mL / OUT: 450 mL / NET: -450 mL        PHYSICAL EXAM:    GENERAL: In no apparent distress, well nourished, and hydrated.  HEART: Regular rate and rhythm; No murmur; NO rubs, or gallops.  PULMONARY: Clear to auscultation and percussion.  Normal expansion/effort. No rales, wheezing, or rhonchi bilaterally.  ABDOMEN: Soft, Nontender, Nondistended; Bowel sounds present  EXTREMITIES:  Extremities warm, pink, well-perfused, 2+ Peripheral Pulses, No clubbing, cyanosis, or edema  NEUROLOGICAL: alert & oriented x 3, no focal deficits, PERRLA, EOMI    LABS:                        11.4   7.47  )-----------( 168      ( 02 Sep 2024 04:50 )             33.6     09-02    133<L>  |  104  |  40<H>  ----------------------------<  96  4.2   |  15<L>  |  2.6<H>    Ca    7.7<L>      02 Sep 2024 04:50  Phos  3.4     09-02  Mg     2.3     09-02    TPro  5.2<L>  /  Alb  3.0<L>  /  TBili  0.6  /  DBili  x   /  AST  25  /  ALT  34  /  AlkPhos  81  09-02    PTT - ( 02 Sep 2024 04:50 )  PTT:119.2 sec        12 Lead ECG:   Ventricular Rate 63 BPM    Atrial Rate 75 BPM    QRS Duration 96 ms    Q-T Interval 442 ms    QTC Calculation(Bazett) 452 ms    R Axis 8 degrees    T Axis -56 degrees    Diagnosis Line Atrial fibrillation  Nonspecific ST and T wave abnormality  Abnormal ECG    Confirmed by Tirso Moraes ([id number]) on 9/1/2024 6:48:27 AM (09-01-24 @ 05:58)  12 Lead ECG:   Ventricular Rate 95 BPM    Atrial Rate 95 BPM    P-R Interval 144 ms    QRS Duration 94 ms    Q-T Interval 346 ms    QTC Calculation(Bazett) 434 ms    P Axis -16 degrees    R Axis -8 degrees    T Axis -24 degrees    Diagnosis Line Normal sinus rhythm  Nonspecific ST and T wave abnormality  Abnormal ECG    Confirmed by Tirso Moraes ([id number]) on 8/31/2024 5:35:07 PM (08-31-24 @ 06:21)      RADIOLOGY & ADDITIONAL TESTS:

## 2024-09-02 NOTE — PROGRESS NOTE ADULT - SUBJECTIVE AND OBJECTIVE BOX
89-year-old male past medical history CAD status post recent PCI, hypertension, hyperlipidemia, A-fib, CKD stage IIIb who presents with symptomatic bradycardia.         INTERVAL HPI/OVERNIGHT EVENTS:   Pt became hypertensive last night. s/p nifedipine 60mg and hydralazine 25mg * 2 doses    Subjective:    ICU Vital Signs Last 24 Hrs  T(C): 36.8 (02 Sep 2024 04:00), Max: 36.8 (02 Sep 2024 04:00)  T(F): 98.3 (02 Sep 2024 04:00), Max: 98.3 (02 Sep 2024 04:00)  HR: 85 (02 Sep 2024 09:00) (68 - 97)  BP: 161/81 (01 Sep 2024 18:49) (161/81 - 161/81)  BP(mean): --  ABP: 137/70 (02 Sep 2024 09:00) (123/59 - 195/94)  ABP(mean): 93 (02 Sep 2024 09:) (81 - 133)  RR: 23 (02 Sep 2024 09:) (19 - 39)  SpO2: 100% (02 Sep 2024 09:00) (98% - 100%)    O2 Parameters below as of 02 Sep 2024 07:00  Patient On (Oxygen Delivery Method): nasal cannula  O2 Flow (L/min): 2        I&O's Summary    01 Sep 2024 07:01  -  02 Sep 2024 07:00  --------------------------------------------------------  IN: 1212.3 mL / OUT: 1577 mL / NET: -364.7 mL    02 Sep 2024 07:01  -  02 Sep 2024 10:57  --------------------------------------------------------  IN: 0 mL / OUT: 450 mL / NET: -450 mL          Daily     Daily Weight in k.1 (02 Sep 2024 04:00)    Adult Advanced Hemodynamics Last 24 Hrs  CVP(mm Hg): --  CVP(cm H2O): --  CO: --  CI: --  PA: --  PA(mean): --  PCWP: --  SVR: --  SVRI: --  PVR: --  PVRI: --    EKG/Telemetry Events:    MEDICATIONS  (STANDING):  aspirin enteric coated 81 milliGRAM(s) Oral daily  atorvastatin 40 milliGRAM(s) Oral at bedtime  carvedilol 6.25 milliGRAM(s) Oral every 12 hours  chlorhexidine 2% Cloths 1 Application(s) Topical daily  clopidogrel Tablet 75 milliGRAM(s) Oral daily  finasteride 5 milliGRAM(s) Oral daily  heparin  Infusion. 900 Unit(s)/Hr (9 mL/Hr) IV Continuous <Continuous>  hydrALAZINE 25 milliGRAM(s) Oral three times a day  pantoprazole    Tablet 40 milliGRAM(s) Oral before breakfast  sodium bicarbonate 650 milliGRAM(s) Oral every 8 hours    MEDICATIONS  (PRN):  heparin   Injectable 6000 Unit(s) IV Push every 6 hours PRN For aPTT less than 40  heparin   Injectable 3000 Unit(s) IV Push every 6 hours PRN For aPTT between 40 - 57      PHYSICAL EXAM:  GENERAL:   HEAD:  Atraumatic, Normocephalic  EYES: EOMI, PERRLA, conjunctiva and sclera clear  NECK: Supple, No JVD, Normal thyroid, no enlarged nodes  NERVOUS SYSTEM:  Alert & Awake.   CHEST/LUNG: B/L good air entry; No rales, rhonchi, or wheezing  HEART: S1S2 normal, no S3, Regular rate and rhythm; No murmurs  ABDOMEN: Soft, Nontender, Nondistended; Bowel sounds present  EXTREMITIES:  2+ Peripheral Pulses, No clubbing, cyanosis, or edema  LYMPH: No lymphadenopathy noted  SKIN: No rashes or lesions    LABS:                        11.4   7.47  )-----------( 168      ( 02 Sep 2024 04:50 )             33.6     09-02    133<L>  |  104  |  40<H>  ----------------------------<  96  4.2   |  15<L>  |  2.6<H>    Ca    7.7<L>      02 Sep 2024 04:50  Phos  3.4     09-02  Mg     2.3         TPro  5.2<L>  /  Alb  3.0<L>  /  TBili  0.6  /  DBili  x   /  AST  25  /  ALT  34  /  AlkPhos  81      LIVER FUNCTIONS - ( 02 Sep 2024 04:50 )  Alb: 3.0 g/dL / Pro: 5.2 g/dL / ALK PHOS: 81 U/L / ALT: 34 U/L / AST: 25 U/L / GGT: x           PTT - ( 02 Sep 2024 04:50 )  PTT:119.2 sec  CAPILLARY BLOOD GLUCOSE                Urinalysis Basic - ( 02 Sep 2024 04:50 )    Color: x / Appearance: x / SG: x / pH: x  Gluc: 96 mg/dL / Ketone: x  / Bili: x / Urobili: x   Blood: x / Protein: x / Nitrite: x   Leuk Esterase: x / RBC: x / WBC x   Sq Epi: x / Non Sq Epi: x / Bacteria: x          RADIOLOGY & ADDITIONAL TESTS:  CXR:        Care Discussed with Consultants/Other Providers [ x] YES  [ ] NO

## 2024-09-03 LAB
ALBUMIN SERPL ELPH-MCNC: 3.5 G/DL — SIGNIFICANT CHANGE UP (ref 3.5–5.2)
ALP SERPL-CCNC: 87 U/L — SIGNIFICANT CHANGE UP (ref 30–115)
ALT FLD-CCNC: 34 U/L — SIGNIFICANT CHANGE UP (ref 0–41)
ANION GAP SERPL CALC-SCNC: 10 MMOL/L — SIGNIFICANT CHANGE UP (ref 7–14)
APTT BLD: 89.8 SEC — CRITICAL HIGH (ref 27–39.2)
APTT BLD: 90.3 SEC — CRITICAL HIGH (ref 27–39.2)
AST SERPL-CCNC: 28 U/L — SIGNIFICANT CHANGE UP (ref 0–41)
BASOPHILS # BLD AUTO: 0.03 K/UL — SIGNIFICANT CHANGE UP (ref 0–0.2)
BASOPHILS NFR BLD AUTO: 0.4 % — SIGNIFICANT CHANGE UP (ref 0–1)
BILIRUB SERPL-MCNC: 0.7 MG/DL — SIGNIFICANT CHANGE UP (ref 0.2–1.2)
BUN SERPL-MCNC: 32 MG/DL — HIGH (ref 10–20)
CALCIUM SERPL-MCNC: 8.4 MG/DL — SIGNIFICANT CHANGE UP (ref 8.4–10.5)
CHLORIDE SERPL-SCNC: 108 MMOL/L — SIGNIFICANT CHANGE UP (ref 98–110)
CO2 SERPL-SCNC: 20 MMOL/L — SIGNIFICANT CHANGE UP (ref 17–32)
CREAT SERPL-MCNC: 2.5 MG/DL — HIGH (ref 0.7–1.5)
EGFR: 24 ML/MIN/1.73M2 — LOW
EOSINOPHIL # BLD AUTO: 0.19 K/UL — SIGNIFICANT CHANGE UP (ref 0–0.7)
EOSINOPHIL NFR BLD AUTO: 2.7 % — SIGNIFICANT CHANGE UP (ref 0–8)
GLUCOSE SERPL-MCNC: 94 MG/DL — SIGNIFICANT CHANGE UP (ref 70–99)
HCT VFR BLD CALC: 37.5 % — LOW (ref 42–52)
HGB BLD-MCNC: 12.8 G/DL — LOW (ref 14–18)
IMM GRANULOCYTES NFR BLD AUTO: 0.3 % — SIGNIFICANT CHANGE UP (ref 0.1–0.3)
LYMPHOCYTES # BLD AUTO: 1.55 K/UL — SIGNIFICANT CHANGE UP (ref 1.2–3.4)
LYMPHOCYTES # BLD AUTO: 21.7 % — SIGNIFICANT CHANGE UP (ref 20.5–51.1)
MAGNESIUM SERPL-MCNC: 2.5 MG/DL — HIGH (ref 1.8–2.4)
MCHC RBC-ENTMCNC: 31.4 PG — HIGH (ref 27–31)
MCHC RBC-ENTMCNC: 34.1 G/DL — SIGNIFICANT CHANGE UP (ref 32–37)
MCV RBC AUTO: 92.1 FL — SIGNIFICANT CHANGE UP (ref 80–94)
MONOCYTES # BLD AUTO: 0.78 K/UL — HIGH (ref 0.1–0.6)
MONOCYTES NFR BLD AUTO: 10.9 % — HIGH (ref 1.7–9.3)
NEUTROPHILS # BLD AUTO: 4.58 K/UL — SIGNIFICANT CHANGE UP (ref 1.4–6.5)
NEUTROPHILS NFR BLD AUTO: 64 % — SIGNIFICANT CHANGE UP (ref 42.2–75.2)
NRBC # BLD: 0 /100 WBCS — SIGNIFICANT CHANGE UP (ref 0–0)
PHOSPHATE SERPL-MCNC: 2.9 MG/DL — SIGNIFICANT CHANGE UP (ref 2.1–4.9)
PLATELET # BLD AUTO: 206 K/UL — SIGNIFICANT CHANGE UP (ref 130–400)
PMV BLD: 10.9 FL — HIGH (ref 7.4–10.4)
POTASSIUM SERPL-MCNC: 4.1 MMOL/L — SIGNIFICANT CHANGE UP (ref 3.5–5)
POTASSIUM SERPL-SCNC: 4.1 MMOL/L — SIGNIFICANT CHANGE UP (ref 3.5–5)
PROT SERPL-MCNC: 5.9 G/DL — LOW (ref 6–8)
RBC # BLD: 4.07 M/UL — LOW (ref 4.7–6.1)
RBC # FLD: 13.5 % — SIGNIFICANT CHANGE UP (ref 11.5–14.5)
SODIUM SERPL-SCNC: 138 MMOL/L — SIGNIFICANT CHANGE UP (ref 135–146)
WBC # BLD: 7.15 K/UL — SIGNIFICANT CHANGE UP (ref 4.8–10.8)
WBC # FLD AUTO: 7.15 K/UL — SIGNIFICANT CHANGE UP (ref 4.8–10.8)

## 2024-09-03 PROCEDURE — 93010 ELECTROCARDIOGRAM REPORT: CPT

## 2024-09-03 RX ORDER — APIXABAN 5 MG/1
2.5 TABLET, FILM COATED ORAL EVERY 12 HOURS
Refills: 0 | Status: DISCONTINUED | OUTPATIENT
Start: 2024-09-03 | End: 2024-09-04

## 2024-09-03 RX ORDER — HYDRALAZINE HCL 50 MG
25 TABLET ORAL ONCE
Refills: 0 | Status: COMPLETED | OUTPATIENT
Start: 2024-09-03 | End: 2024-09-03

## 2024-09-03 RX ORDER — ISOSORBIDE MONONITRATE 30 MG/1
30 TABLET, EXTENDED RELEASE ORAL DAILY
Refills: 0 | Status: DISCONTINUED | OUTPATIENT
Start: 2024-09-03 | End: 2024-09-04

## 2024-09-03 RX ORDER — HYDRALAZINE HCL 50 MG
50 TABLET ORAL THREE TIMES A DAY
Refills: 0 | Status: DISCONTINUED | OUTPATIENT
Start: 2024-09-03 | End: 2024-09-03

## 2024-09-03 RX ADMIN — CARVEDILOL 6.25 MILLIGRAM(S): 6.25 TABLET ORAL at 17:41

## 2024-09-03 RX ADMIN — CARVEDILOL 6.25 MILLIGRAM(S): 6.25 TABLET ORAL at 05:47

## 2024-09-03 RX ADMIN — Medication 50 MILLIGRAM(S): at 09:55

## 2024-09-03 RX ADMIN — Medication 700 UNIT(S)/HR: at 01:34

## 2024-09-03 RX ADMIN — ISOSORBIDE MONONITRATE 30 MILLIGRAM(S): 30 TABLET, EXTENDED RELEASE ORAL at 17:42

## 2024-09-03 RX ADMIN — Medication 40 MILLIGRAM(S): at 21:42

## 2024-09-03 RX ADMIN — Medication 81 MILLIGRAM(S): at 12:33

## 2024-09-03 RX ADMIN — APIXABAN 2.5 MILLIGRAM(S): 5 TABLET, FILM COATED ORAL at 17:41

## 2024-09-03 RX ADMIN — FINASTERIDE 5 MILLIGRAM(S): 1 TABLET, FILM COATED ORAL at 12:33

## 2024-09-03 RX ADMIN — SODIUM BICARBONATE 650 MILLIGRAM(S): 84 INJECTION, SOLUTION INTRAVENOUS at 17:43

## 2024-09-03 RX ADMIN — NIFEDIPINE 90 MILLIGRAM(S): 60 TABLET, FILM COATED, EXTENDED RELEASE ORAL at 05:46

## 2024-09-03 RX ADMIN — Medication 75 MILLIGRAM(S): at 12:34

## 2024-09-03 RX ADMIN — CHLORHEXIDINE GLUCONATE 1 APPLICATION(S): 40 SOLUTION TOPICAL at 15:33

## 2024-09-03 RX ADMIN — Medication 40 MILLIGRAM(S): at 05:47

## 2024-09-03 RX ADMIN — SODIUM BICARBONATE 650 MILLIGRAM(S): 84 INJECTION, SOLUTION INTRAVENOUS at 21:42

## 2024-09-03 RX ADMIN — SODIUM BICARBONATE 650 MILLIGRAM(S): 84 INJECTION, SOLUTION INTRAVENOUS at 05:47

## 2024-09-03 RX ADMIN — Medication 25 MILLIGRAM(S): at 03:31

## 2024-09-03 NOTE — PROGRESS NOTE ADULT - SUBJECTIVE AND OBJECTIVE BOX
Chief complaint: Patient is a 89y old  Male who presents with a chief complaint of Bradycardia (02 Sep 2024 14:24)    Hospital Course:  89M, Cantonese speaking, with PMHx of persistent AF (on Eliquis), HTN, HLD, CKD Stage 3b (unclear baseline), thoracic aortic ectasia, BPH, vitamin D deficiency, chronic hepatitis B,   CAD and severe low flow, low gradient AS: s/p Left and RHC at [place] on . Patient was recently discharged on coreg 6.25 BID and Cardizem 180, however on Wednesday, meds were adjusted by his cardiologist in [facility] Dr Scott to carvedilol 25 mg BID and Nifedipine 90 mg XL OD. Also hydralazine 10 mg and valsartan 40 mg that he was discharged on were switched to losartan 100 mg OD. Patient woke up very lethargic and weak . Family called EMS; on their arrival, he was bradycardic to the 30s and with unobtainable blood pressure upon arrival. He was given 1 of atropine and 250 cc bolus with some improvement and was transcutaneously paced en route. Upon arrival to the emergency department he was bradycardic and hypotensive. Given that he didn't improve after atropine, he was started on dopamine and levophed; and was given glucagon.   CCU course:  - : started on dopamine and levophed. hold carvedilol, nifedipine, hydralazine, losartan, tamsulosin, farxiga. heparin gtt for afib  - : wean off dopamine and levophed as able. Increased atorva to 40mg  - : wean off dopamine. started hydralazine and nifedipine   - : restarted coreg 6.25mg   - 9/3 dc hydralazine. started imdur 30mg    s/p Left and right Heart cath on 24: p-mRCA 80-95% s/p rota and WOLF x2; dLM 30%; LAD 80%, mLAD 90%; D1 mod diffuse, D2 mod diffuse; LCx heavily calcified 80% diffuse; OM2 anio FFR 0.72. s/p RHC 24: RA 13; RV 31/10; PA 38/23 (29); PCWP 19; CO / CI 3.13 / 1.8; AoSat 98/8%; PaSat 68.2%; dopamine 1mcg / kg / min for AV valve studypeak to peak ratio 10.4mmHg; mean 13.75mmHg; MATT 0.91cm; SV 22.63.   TTE 24: EF 55-60% mild concentric LVH; no RWMA; LA severely dilated; severe AS; mean 19.7; MATT 0.7; V max 2.95    Interval history: Pt was transferred to  for further care.     Review of systems: A complete 10-point review of systems was obtained and is negative except as stated in the interval history.    Vitals:  T(F): 98.4, Max: 98.4 ( @ 08:00)  HR: 87 (71 - 99)  BP: 160/94 (148/98 - 160/94)  RR: 19 (16 - 36)  SpO2: 100% (96% - 100%)    Ins & outs:      @ :  -   @ 07:00  --------------------------------------------------------  IN: 1078.7 mL / OUT: 860 mL / NET: 218.7 mL     @ 07: @ 07:00  --------------------------------------------------------  IN: 1212.3 mL / OUT: 1577 mL / NET: -364.7 mL     @ 07: @ 07:00  --------------------------------------------------------  IN: 491 mL / OUT: 2550 mL / NET: -2059 mL     @ : @ 18:38  --------------------------------------------------------  IN: 362 mL / OUT: 500 mL / NET: -138 mL      Weight trend:  Weight (kg): 72.7 ()    Physical exam:   HEAD:  Atraumatic, Normocephalic  EYES: EOMI, PERRLA, conjunctiva and sclera clear  NECK: Supple, No JVD, Normal thyroid, no enlarged nodes  NERVOUS SYSTEM:  Alert & Awake.   CHEST/LUNG: B/L good air entry; No rales, rhonchi, or wheezing  HEART: S1S2 normal, no S3, Regular rate and rhythm; No murmurs  ABDOMEN: Soft, Nontender, Nondistended; Bowel sounds present  EXTREMITIES:  2+ Peripheral Pulses, No clubbing, cyanosis, or edema  LYMPH: No lymphadenopathy noted  SKIN: No rashes or lesions    Data reviewed:  - Telemetry: To be reviewed   - EC/2/24: Atrial fibrillation with slow ventricular response   - TTE:    24: EF 55-60% mild concentric LVH; no RWMA; LA severely dilated; severe AS; mean 19.7; MATT 0.7; V max 2.95  - Chest x-ray:   24: No acute pulmonary process  - Stress test:   - CCTA:  - Cardiac catheterization:  Left and right Heart cath on 24: p-mRCA 80-95% s/p rota and WOLF x2; dLM 30%; LAD 80%, mLAD 90%; D1 mod diffuse, D2 mod diffuse; LCx heavily calcified 80% diffuse; OM2 anio FFR 0.72. s/p RHC 24: RA 13; RV 31/10; PA 38/23 (29); PCWP 19; CO / CI 3.13 / 1.8; AoSat 98/8%; PaSat 68.2%; dopamine 1mcg / kg / min for AV valve studypeak to peak ratio 10.4mmHg; mean 13.75mmHg; MATT 0.91cm; SV 22.63.   - Cardiac MRI:    - Labs:                        12.8   7.15  )-----------( 206      ( 03 Sep 2024 04:40 )             37.5     -    138  |  108  |  32<H>  ----------------------------<  94  4.1   |  20  |  2.5<H>    Ca    8.4      03 Sep 2024 04:40  Phos  2.9       Mg     2.5         TPro  5.9<L>  /  Alb  3.5  /  TBili  0.7  /  DBili  x   /  AST  28  /  ALT  34  /  AlkPhos  87  -    PTT - ( 03 Sep 2024 04:40 )  PTT:90.3 sec    Triglycerides, Serum: 120 mg/dL (24 @ 07:12)  LDL Cholesterol Calculated: 130 mg/dL (24 @ 07:12)    Thyroid Stimulating Hormone, Serum: 1.36 uIU/mL (24 @ 05:30)    Urinalysis Basic - ( 03 Sep 2024 04:40 )    Color: x / Appearance: x / SG: x / pH: x  Gluc: 94 mg/dL / Ketone: x  / Bili: x / Urobili: x   Blood: x / Protein: x / Nitrite: x   Leuk Esterase: x / RBC: x / WBC x   Sq Epi: x / Non Sq Epi: x / Bacteria: x        Medications:  apixaban 2.5 milliGRAM(s) Oral every 12 hours  aspirin enteric coated 81 milliGRAM(s) Oral daily  atorvastatin 40 milliGRAM(s) Oral at bedtime  carvedilol 6.25 milliGRAM(s) Oral every 12 hours  chlorhexidine 2% Cloths 1 Application(s) Topical daily  clopidogrel Tablet 75 milliGRAM(s) Oral daily  finasteride 5 milliGRAM(s) Oral daily  isosorbide   mononitrate ER Tablet (IMDUR) 30 milliGRAM(s) Oral daily  NIFEdipine XL 90 milliGRAM(s) Oral daily  pantoprazole    Tablet 40 milliGRAM(s) Oral before breakfast  sodium bicarbonate 650 milliGRAM(s) Oral every 8 hours    Drips:    PRN:     Allergies    No Known Allergies    Intolerances

## 2024-09-03 NOTE — PROGRESS NOTE ADULT - ASSESSMENT
89-year-old male past medical history CAD status post recent PCI, hypertension, hyperlipidemia, A-fib, CKD stage IIIb who presents with symptomatic bradycardia.     #Symptomatic bradycardia (Afib with Slow VR)   -off levo and dopa  -Continue atorvastatin 40 mg  -Continue DAPT (ASA/Plavix)  -Transition heparin gtt to eliquis 2.5mg bid for AF  - c/w coreg 6.25mg q12h  - c/w nifedipine 90mg  - dc hydralazine   - start imdur 30mg daily  - EP needs to follow up prior to dc for MCOT     #baseline persistent AF   -Transition heparin gtt to eliquis 2.5mg bid for AF  - c/w coreg 6.25mg q12h    #HLD   -c/w atorvastatin 40mg QD     #CAD s/p C 8/23/24: p-mRCA 80-95% s/p rota and WOLF x2; and planned for staged PCI of LAD / LCx   -c/w aspirin 81mg   -c/w Plavix 75mg    #severe low flow, low gradient AS    #HTN  -c/w Imdur 30mg QD  -c/w Nifedipine 90mg QD  -c/w Carvedilol 6.25 BID    #ALESSANDRA on CKD Stage 3b (unclear baseline)  - Monitor BMP/Cr  - Avoid nephrotoxic agents   - ALESSANDRA -> cr 2.5. improving  - c/w sodium bicarb 650 TID to target bicarb 22; (now it is 20)    #BPH  -c/w Finasteride 5mg QD     #Misc  DVT prophylaxis: Eliquis BID   GI prophylaxis: Protonix 40mg QD  PT/OT consulted     CODE STATUS: full    FOR FOLLOW UP:  [PT/OT ]   [Started Imdur 30 today ]  [MCOT on dc].

## 2024-09-03 NOTE — CHART NOTE - NSCHARTNOTEFT_GEN_A_CORE
Transfer Note    Transfer from: CCU    Transfer to: 4T    Signout given to:     ----------------------------------------------------------------------------------------------------------  HPI / CCU COURSE:    HPI:  89M, Cantonese speaking, with PMHx:  - persistent AF (on Eliquis),   - HTN,   - HLD,   - CKD Stage 3b (unclear baseline),   - thoracic aortic ectasia,   - BPH,   - vitamin D deficiency,   - chronic hepatitis B,   - CAD and severe low flow, low gradient AS:   s/p Left and right Heart cath on 8/23/24: p-mRCA 80-95% s/p rota and WOLF x2; dLM 30%; LAD 80%, mLAD 90%; D1 mod diffuse, D2 mod diffuse; LCx heavily calcified 80% diffuse; OM2 anio FFR 0.72. s/p RHC 8/23/24: RA 13; RV 31/10; PA 38/23 (29); PCWP 19; CO / CI 3.13 / 1.8; AoSat 98/8%; PaSat 68.2%; dopamine 1mcg / kg / min for AV valve studypeak to peak ratio 10.4mmHg; mean 13.75mmHg; MATT 0.91cm; SV 22.63.   - TTE 8/2/24: EF 55-60% mild concentric LVH; no RWMA; LA severely dilated; severe AS; mean 19.7; MATT 0.7; V max 2.95    Patient woke up today very lethargic and weak .  Family called EMS; on their arrival, he was bradycardic to the 30s and with unobtainable blood pressure upon arrival.   Given 1 of atropine and 250 cc bolus with some improvement.    He was transcutaneously paced en route.   on arrival to the emergency department he was bradycardic and hypotensive.   Given that he didn't improve after atropine, he was started on dopamine and levophed; and was given glucagon.     Patient was recently discharged on coreg 6.25 BID and Cardizem 180, however on Wednesday, meds were adjusted by his cardiologist in [facility] Dr Scott to carvedilol 25 mg BID and Nifedipine 90 mg XL OD   Also hydralazine 10 mg and valsartan 40 mg that he was discharged on were switched to losartan 100 mg OD   (30 Aug 2024 15:38)      --------------------------------------------------------------------------------------------------------  PMH/PSH:  PAST MEDICAL & SURGICAL HISTORY:  Benign essential HTN    HLD (hyperlipidemia)    Chronic kidney disease, unspecified CKD stage    H/O abdominal aortic aneurysm    Severe aortic stenosis    BPH (benign prostatic hyperplasia)    Chronic hepatitis B    Tricuspid regurgitation    Paroxysmal atrial fibrillation    H/O hemorrhoidectomy        -------------------------------------------------------------------------------------------------------  PHYSICAL EXAM:  General: NAD  HEENT: Atraumatic  Respiratory: CTAB  Cardiac: RRR  Abdomen: soft, non-tender, non-distended  Extremities: warm and well-perfused  Neuro: A+Ox4. No FND    Vital Signs Last 24 Hrs  T(C): 36.9 (03 Sep 2024 12:00), Max: 36.9 (03 Sep 2024 08:00)  T(F): 98.4 (03 Sep 2024 12:00), Max: 98.4 (03 Sep 2024 08:00)  HR: 76 (03 Sep 2024 15:00) (71 - 99)  BP: 160/94 (03 Sep 2024 05:00) (148/98 - 160/94)  BP(mean): 119 (03 Sep 2024 05:00) (113 - 124)  RR: 31 (03 Sep 2024 15:00) (16 - 36)  SpO2: 100% (03 Sep 2024 15:00) (96% - 100%)    Parameters below as of 03 Sep 2024 15:00  Patient On (Oxygen Delivery Method): room air        I&O's Summary    02 Sep 2024 07:01  -  03 Sep 2024 07:00  --------------------------------------------------------  IN: 491 mL / OUT: 2550 mL / NET: -2059 mL    03 Sep 2024 07:01  -  03 Sep 2024 15:45  --------------------------------------------------------  IN: 42 mL / OUT: 500 mL / NET: -458 mL        --------------------------------------------------------------------------------------------------------  LABS:                               12.8   7.15  )-----------( 206      ( 03 Sep 2024 04:40 )             37.5       09-03    138  |  108  |  32<H>  ----------------------------<  94  4.1   |  20  |  2.5<H>    Ca    8.4      03 Sep 2024 04:40  Phos  2.9     09-03  Mg     2.5     09-03    TPro  5.9<L>  /  Alb  3.5  /  TBili  0.7  /  DBili  x   /  AST  28  /  ALT  34  /  AlkPhos  87  09-03      PTT - ( 03 Sep 2024 04:40 )  PTT:90.3 sec        CULTURE RESULTS:                08-31-24 @ 17:18  Specimen Source: --  Method Type: --  Gram Stain - RRL: --  Gram Stain - Wound: --  Bacteria: --  Culture Results:   >=3 organisms. Probable collection contamination.      Specimen Source:   Method Type:   Gram Stain:   Culture Results: Culture Results:   >=3 organisms. Probable collection contamination. (08-31-24 @ 17:18)    Bacteria: Bacteria: Occasional /HPF (08-31-24 @ 17:13)        -------------------------------------------------------------------------------------------------  RADIOLOGY:      ---------------------------------------------------------------------------------------------------  ASSESSMENT & PLAN:     89-year-old male past medical history CAD status post recent PCI, hypertension, hyperlipidemia, A-fib, CKD stage IIIb who presents with symptomatic bradycardia.      IMPRESSION:    #Symptomatic unstable Afib with Slow VR   #baseline persistent AF   #CAD s/p [facility] 8/23/24: p-mRCA 80-95% s/p rota and WOLF x2; and planned for staged PCI of LAD / LCx   #severe low flow, low gradient AS  #HTN  #ALESSANDRA on CKD Stage 3b (unclear baseline)      PLAN:    CNS: AO3, no need for sedation. CTH negative.    PULMONARY: On room air saturating at 100%. HOB @ 45. Monitor Pulse Ox. Keep > 93%. Supplement as needed.    CARDIOVASCULAR:   -off levo and dopa  -Continue atorvastatin 40 mg  -Continue DAPT (ASA/Plavix)  -Continue heparin gtt for AF  -restart coreg 6.25mg q12h  - c/w nifedipine 90mg  - c/w hydralazine 25mg q8h    GI:   - GI prophylaxis.   - S&S eval: regular diet    RENAL:   - Monitor BMP/Cr  - Avoid nephrotoxic agents   - ALESSANDRA -> cr 2.6. improving  - c/w sodium bicarb 650 TID to target bicarb 22; (now it is 15)  - f up repeat BMP ( to replete K+ and calcium)     INFECTIOUS DISEASE:   - Monitor WBC  - no signs or symptoms of infection    HEMATOLOGICAL:   - Monitor H&H, Keep active T&S  - heparin drip instead of eliquis    ENDOCRINE:   - recent A1c within normal limits   - f up TSH    MUSCULOSKELETAL:   - increase as tolerated   - hematoma at the level of the left thigh -> monitor to assess improvement    Lines: Right IJ central line & Left radial A line taken out on 9/2  CODE STATUS: full        FOR FOLLOW UP:  [PT/OT ]   [Started Imdur 30 today ] Transfer Note    Transfer from: CCU    Transfer to: 4T    Signout given to:     ----------------------------------------------------------------------------------------------------------  HPI / CCU COURSE:    HPI:  89M, Cantonese speaking, with PMHx:  - persistent AF (on Eliquis),   - HTN,   - HLD,   - CKD Stage 3b (unclear baseline),   - thoracic aortic ectasia,   - BPH,   - vitamin D deficiency,   - chronic hepatitis B,   - CAD and severe low flow, low gradient AS:   s/p Left and right Heart cath on 8/23/24: p-mRCA 80-95% s/p rota and WOLF x2; dLM 30%; LAD 80%, mLAD 90%; D1 mod diffuse, D2 mod diffuse; LCx heavily calcified 80% diffuse; OM2 anio FFR 0.72. s/p RHC 8/23/24: RA 13; RV 31/10; PA 38/23 (29); PCWP 19; CO / CI 3.13 / 1.8; AoSat 98/8%; PaSat 68.2%; dopamine 1mcg / kg / min for AV valve studypeak to peak ratio 10.4mmHg; mean 13.75mmHg; MATT 0.91cm; SV 22.63.   - TTE 8/2/24: EF 55-60% mild concentric LVH; no RWMA; LA severely dilated; severe AS; mean 19.7; MATT 0.7; V max 2.95    Patient woke up today very lethargic and weak .  Family called EMS; on their arrival, he was bradycardic to the 30s and with unobtainable blood pressure upon arrival.   Given 1 of atropine and 250 cc bolus with some improvement.    He was transcutaneously paced en route.   on arrival to the emergency department he was bradycardic and hypotensive.   Given that he didn't improve after atropine, he was started on dopamine and levophed; and was given glucagon.     Patient was recently discharged on coreg 6.25 BID and Cardizem 180, however on Wednesday, meds were adjusted by his cardiologist in [facility] Dr Scott to carvedilol 25 mg BID and Nifedipine 90 mg XL OD   Also hydralazine 10 mg and valsartan 40 mg that he was discharged on were switched to losartan 100 mg OD   (30 Aug 2024 15:38)      --------------------------------------------------------------------------------------------------------  PMH/PSH:  PAST MEDICAL & SURGICAL HISTORY:  Benign essential HTN    HLD (hyperlipidemia)    Chronic kidney disease, unspecified CKD stage    H/O abdominal aortic aneurysm    Severe aortic stenosis    BPH (benign prostatic hyperplasia)    Chronic hepatitis B    Tricuspid regurgitation    Paroxysmal atrial fibrillation    H/O hemorrhoidectomy        -------------------------------------------------------------------------------------------------------  PHYSICAL EXAM:  General: NAD  HEENT: Atraumatic  Respiratory: CTAB  Cardiac: RRR  Abdomen: soft, non-tender, non-distended  Extremities: warm and well-perfused  Neuro: A+Ox4. No FND    Vital Signs Last 24 Hrs  T(C): 36.9 (03 Sep 2024 12:00), Max: 36.9 (03 Sep 2024 08:00)  T(F): 98.4 (03 Sep 2024 12:00), Max: 98.4 (03 Sep 2024 08:00)  HR: 76 (03 Sep 2024 15:00) (71 - 99)  BP: 160/94 (03 Sep 2024 05:00) (148/98 - 160/94)  BP(mean): 119 (03 Sep 2024 05:00) (113 - 124)  RR: 31 (03 Sep 2024 15:00) (16 - 36)  SpO2: 100% (03 Sep 2024 15:00) (96% - 100%)    Parameters below as of 03 Sep 2024 15:00  Patient On (Oxygen Delivery Method): room air        I&O's Summary    02 Sep 2024 07:01  -  03 Sep 2024 07:00  --------------------------------------------------------  IN: 491 mL / OUT: 2550 mL / NET: -2059 mL    03 Sep 2024 07:01  -  03 Sep 2024 15:45  --------------------------------------------------------  IN: 42 mL / OUT: 500 mL / NET: -458 mL        --------------------------------------------------------------------------------------------------------  LABS:                               12.8   7.15  )-----------( 206      ( 03 Sep 2024 04:40 )             37.5       09-03    138  |  108  |  32<H>  ----------------------------<  94  4.1   |  20  |  2.5<H>    Ca    8.4      03 Sep 2024 04:40  Phos  2.9     09-03  Mg     2.5     09-03    TPro  5.9<L>  /  Alb  3.5  /  TBili  0.7  /  DBili  x   /  AST  28  /  ALT  34  /  AlkPhos  87  09-03      PTT - ( 03 Sep 2024 04:40 )  PTT:90.3 sec        CULTURE RESULTS:                08-31-24 @ 17:18  Specimen Source: --  Method Type: --  Gram Stain - RRL: --  Gram Stain - Wound: --  Bacteria: --  Culture Results:   >=3 organisms. Probable collection contamination.      Specimen Source:   Method Type:   Gram Stain:   Culture Results: Culture Results:   >=3 organisms. Probable collection contamination. (08-31-24 @ 17:18)    Bacteria: Bacteria: Occasional /HPF (08-31-24 @ 17:13)        -------------------------------------------------------------------------------------------------  RADIOLOGY:      ---------------------------------------------------------------------------------------------------  ASSESSMENT & PLAN:     89-year-old male past medical history CAD status post recent PCI, hypertension, hyperlipidemia, A-fib, CKD stage IIIb who presents with symptomatic bradycardia.      IMPRESSION:    #Symptomatic bradycardia (Afib with Slow VR)   #baseline persistent AF   #CAD s/p [facility] 8/23/24: p-mRCA 80-95% s/p rota and WOLF x2; and planned for staged PCI of LAD / LCx   #severe low flow, low gradient AS  #HTN  #ALESSANDRA on CKD Stage 3b (unclear baseline)      PLAN:    CNS: AO3. CTH negative.    PULMONARY: On room air saturating at 100%. HOB @ 45. Monitor Pulse Ox. Keep > 93%. Supplement as needed.    CARDIOVASCULAR:   -off levo and dopa  -Continue atorvastatin 40 mg  -Continue DAPT (ASA/Plavix)  -Transition heparin gtt to eliquis 2.5mg bid for AF  - c/w coreg 6.25mg q12h  - c/w nifedipine 90mg  - dc hydralazine   - start imdur 30mg daily    GI:   - GI prophylaxis.   - S&S eval: regular diet    RENAL:   - Monitor BMP/Cr  - Avoid nephrotoxic agents   - ALESSANDRA -> cr 2.5. improving  - c/w sodium bicarb 650 TID to target bicarb 22; (now it is 20)    INFECTIOUS DISEASE:   - Monitor WBC  - no signs or symptoms of infection    HEMATOLOGICAL:   - Monitor H&H, Keep active T&S  - eliquis 2.5mg bid for DVT ppx    ENDOCRINE:   - A1C 5.3  - TSH 1.36    MUSCULOSKELETAL:   - PT/OT    Lines: Right IJ central line taken out on 9/2; Left radial A line taken out on 9/3  CODE STATUS: full    DISPOSITION: 4T          FOR FOLLOW UP:  [PT/OT ]   [Started Imdur 30 today ] Transfer Note    Transfer from: CCU    Transfer to: 4T    Signout given to:     ----------------------------------------------------------------------------------------------------------  HPI / CCU COURSE:    HPI:  89M, Cantonese speaking, with PMHx:  - persistent AF (on Eliquis),   - HTN,   - HLD,   - CKD Stage 3b (unclear baseline),   - thoracic aortic ectasia,   - BPH,   - vitamin D deficiency,   - chronic hepatitis B,   - CAD and severe low flow, low gradient AS:   s/p Left and right Heart cath on 8/23/24: p-mRCA 80-95% s/p rota and WOLF x2; dLM 30%; LAD 80%, mLAD 90%; D1 mod diffuse, D2 mod diffuse; LCx heavily calcified 80% diffuse; OM2 anio FFR 0.72. s/p RHC 8/23/24: RA 13; RV 31/10; PA 38/23 (29); PCWP 19; CO / CI 3.13 / 1.8; AoSat 98/8%; PaSat 68.2%; dopamine 1mcg / kg / min for AV valve studypeak to peak ratio 10.4mmHg; mean 13.75mmHg; MATT 0.91cm; SV 22.63.   - TTE 8/2/24: EF 55-60% mild concentric LVH; no RWMA; LA severely dilated; severe AS; mean 19.7; MATT 0.7; V max 2.95    Patient woke up today very lethargic and weak .  Family called EMS; on their arrival, he was bradycardic to the 30s and with unobtainable blood pressure upon arrival.   Given 1 of atropine and 250 cc bolus with some improvement.    He was transcutaneously paced en route.   on arrival to the emergency department he was bradycardic and hypotensive.   Given that he didn't improve after atropine, he was started on dopamine and levophed; and was given glucagon.     Patient was recently discharged on coreg 6.25 BID and Cardizem 180, however on Wednesday, meds were adjusted by his cardiologist in [facility] Dr Scott to carvedilol 25 mg BID and Nifedipine 90 mg XL OD   Also hydralazine 10 mg and valsartan 40 mg that he was discharged on were switched to losartan 100 mg OD   (30 Aug 2024 15:38)      --------------------------------------------------------------------------------------------------------  PMH/PSH:  PAST MEDICAL & SURGICAL HISTORY:  Benign essential HTN    HLD (hyperlipidemia)    Chronic kidney disease, unspecified CKD stage    H/O abdominal aortic aneurysm    Severe aortic stenosis    BPH (benign prostatic hyperplasia)    Chronic hepatitis B    Tricuspid regurgitation    Paroxysmal atrial fibrillation    H/O hemorrhoidectomy        -------------------------------------------------------------------------------------------------------  PHYSICAL EXAM:  General: NAD  HEENT: Atraumatic  Respiratory: CTAB  Cardiac: RRR  Abdomen: soft, non-tender, non-distended  Extremities: warm and well-perfused  Neuro: A+Ox4. No FND    Vital Signs Last 24 Hrs  T(C): 36.9 (03 Sep 2024 12:00), Max: 36.9 (03 Sep 2024 08:00)  T(F): 98.4 (03 Sep 2024 12:00), Max: 98.4 (03 Sep 2024 08:00)  HR: 76 (03 Sep 2024 15:00) (71 - 99)  BP: 160/94 (03 Sep 2024 05:00) (148/98 - 160/94)  BP(mean): 119 (03 Sep 2024 05:00) (113 - 124)  RR: 31 (03 Sep 2024 15:00) (16 - 36)  SpO2: 100% (03 Sep 2024 15:00) (96% - 100%)    Parameters below as of 03 Sep 2024 15:00  Patient On (Oxygen Delivery Method): room air        I&O's Summary    02 Sep 2024 07:01  -  03 Sep 2024 07:00  --------------------------------------------------------  IN: 491 mL / OUT: 2550 mL / NET: -2059 mL    03 Sep 2024 07:01  -  03 Sep 2024 15:45  --------------------------------------------------------  IN: 42 mL / OUT: 500 mL / NET: -458 mL        --------------------------------------------------------------------------------------------------------  LABS:                               12.8   7.15  )-----------( 206      ( 03 Sep 2024 04:40 )             37.5       09-03    138  |  108  |  32<H>  ----------------------------<  94  4.1   |  20  |  2.5<H>    Ca    8.4      03 Sep 2024 04:40  Phos  2.9     09-03  Mg     2.5     09-03    TPro  5.9<L>  /  Alb  3.5  /  TBili  0.7  /  DBili  x   /  AST  28  /  ALT  34  /  AlkPhos  87  09-03      PTT - ( 03 Sep 2024 04:40 )  PTT:90.3 sec        CULTURE RESULTS:                08-31-24 @ 17:18  Specimen Source: --  Method Type: --  Gram Stain - RRL: --  Gram Stain - Wound: --  Bacteria: --  Culture Results:   >=3 organisms. Probable collection contamination.      Specimen Source:   Method Type:   Gram Stain:   Culture Results: Culture Results:   >=3 organisms. Probable collection contamination. (08-31-24 @ 17:18)    Bacteria: Bacteria: Occasional /HPF (08-31-24 @ 17:13)        -------------------------------------------------------------------------------------------------  RADIOLOGY:      ---------------------------------------------------------------------------------------------------  ASSESSMENT & PLAN:     89-year-old male past medical history CAD status post recent PCI, hypertension, hyperlipidemia, A-fib, CKD stage IIIb who presents with symptomatic bradycardia.      IMPRESSION:    #Symptomatic bradycardia (Afib with Slow VR)   #baseline persistent AF   #CAD s/p [facility] 8/23/24: p-mRCA 80-95% s/p rota and WOLF x2; and planned for staged PCI of LAD / LCx   #severe low flow, low gradient AS  #HTN  #ALESSANDRA on CKD Stage 3b (unclear baseline)      PLAN:    CNS: AO3. CTH negative.    PULMONARY: On room air saturating at 100%. HOB @ 45. Monitor Pulse Ox. Keep > 93%. Supplement as needed.    CARDIOVASCULAR:   -off levo and dopa  -Continue atorvastatin 40 mg  -Continue DAPT (ASA/Plavix)  -Transition heparin gtt to eliquis 2.5mg bid for AF  - c/w coreg 6.25mg q12h  - c/w nifedipine 90mg  - dc hydralazine   - start imdur 30mg daily    GI:   - GI prophylaxis.   - S&S eval: regular diet    RENAL:   - Monitor BMP/Cr  - Avoid nephrotoxic agents   - ALESSANDRA -> cr 2.5. improving  - c/w sodium bicarb 650 TID to target bicarb 22; (now it is 20)    INFECTIOUS DISEASE:   - Monitor WBC  - no signs or symptoms of infection    HEMATOLOGICAL:   - Monitor H&H, Keep active T&S  - eliquis 2.5mg bid for DVT ppx    ENDOCRINE:   - A1C 5.3  - TSH 1.36    MUSCULOSKELETAL:   - PT/OT    Lines: Right IJ central line taken out on 9/2; Left radial A line taken out on 9/3  CODE STATUS: full    DISPOSITION: 4T          FOR FOLLOW UP:  [PT/OT ]   [Started Imdur 30 today ]  [MCOT on dc] Transfer Note    Transfer from: CCU    Transfer to: 4T    Signout given to: Megan Garcia    ----------------------------------------------------------------------------------------------------------  HPI:    HPI:  89M, Cantonese speaking, with PMHx:  - persistent AF (on Eliquis),   - HTN,   - HLD,   - CKD Stage 3b (unclear baseline),   - thoracic aortic ectasia,   - BPH,   - vitamin D deficiency,   - chronic hepatitis B,   - CAD and severe low flow, low gradient AS:   s/p Left and right Heart cath on 8/23/24: p-mRCA 80-95% s/p rota and WOLF x2; dLM 30%; LAD 80%, mLAD 90%; D1 mod diffuse, D2 mod diffuse; LCx heavily calcified 80% diffuse; OM2 anio FFR 0.72. s/p RHC 8/23/24: RA 13; RV 31/10; PA 38/23 (29); PCWP 19; CO / CI 3.13 / 1.8; AoSat 98/8%; PaSat 68.2%; dopamine 1mcg / kg / min for AV valve studypeak to peak ratio 10.4mmHg; mean 13.75mmHg; MATT 0.91cm; SV 22.63.   - TTE 8/2/24: EF 55-60% mild concentric LVH; no RWMA; LA severely dilated; severe AS; mean 19.7; MATT 0.7; V max 2.95    Patient woke up today very lethargic and weak .  Family called EMS; on their arrival, he was bradycardic to the 30s and with unobtainable blood pressure upon arrival.   Given 1 of atropine and 250 cc bolus with some improvement.    He was transcutaneously paced en route.   on arrival to the emergency department he was bradycardic and hypotensive.   Given that he didn't improve after atropine, he was started on dopamine and levophed; and was given glucagon.     Patient was recently discharged on coreg 6.25 BID and Cardizem 180, however on Wednesday, meds were adjusted by his cardiologist in [facility] Dr Scott to carvedilol 25 mg BID and Nifedipine 90 mg XL OD   Also hydralazine 10 mg and valsartan 40 mg that he was discharged on were switched to losartan 100 mg OD      CCU course:          --------------------------------------------------------------------------------------------------------  PMH/PSH:  PAST MEDICAL & SURGICAL HISTORY:  Benign essential HTN    HLD (hyperlipidemia)    Chronic kidney disease, unspecified CKD stage    H/O abdominal aortic aneurysm    Severe aortic stenosis    BPH (benign prostatic hyperplasia)    Chronic hepatitis B    Tricuspid regurgitation    Paroxysmal atrial fibrillation    H/O hemorrhoidectomy        -------------------------------------------------------------------------------------------------------  PHYSICAL EXAM:  General: NAD  HEENT: Atraumatic  Respiratory: CTAB  Cardiac: RRR  Abdomen: soft, non-tender, non-distended  Extremities: warm and well-perfused  Neuro: A+Ox4. No FND    Vital Signs Last 24 Hrs  T(C): 36.9 (03 Sep 2024 12:00), Max: 36.9 (03 Sep 2024 08:00)  T(F): 98.4 (03 Sep 2024 12:00), Max: 98.4 (03 Sep 2024 08:00)  HR: 76 (03 Sep 2024 15:00) (71 - 99)  BP: 160/94 (03 Sep 2024 05:00) (148/98 - 160/94)  BP(mean): 119 (03 Sep 2024 05:00) (113 - 124)  RR: 31 (03 Sep 2024 15:00) (16 - 36)  SpO2: 100% (03 Sep 2024 15:00) (96% - 100%)    Parameters below as of 03 Sep 2024 15:00  Patient On (Oxygen Delivery Method): room air        I&O's Summary    02 Sep 2024 07:01  -  03 Sep 2024 07:00  --------------------------------------------------------  IN: 491 mL / OUT: 2550 mL / NET: -2059 mL    03 Sep 2024 07:01  -  03 Sep 2024 15:45  --------------------------------------------------------  IN: 42 mL / OUT: 500 mL / NET: -458 mL        --------------------------------------------------------------------------------------------------------  LABS:                               12.8   7.15  )-----------( 206      ( 03 Sep 2024 04:40 )             37.5       09-03    138  |  108  |  32<H>  ----------------------------<  94  4.1   |  20  |  2.5<H>    Ca    8.4      03 Sep 2024 04:40  Phos  2.9     09-03  Mg     2.5     09-03    TPro  5.9<L>  /  Alb  3.5  /  TBili  0.7  /  DBili  x   /  AST  28  /  ALT  34  /  AlkPhos  87  09-03      PTT - ( 03 Sep 2024 04:40 )  PTT:90.3 sec        CULTURE RESULTS:                08-31-24 @ 17:18  Specimen Source: --  Method Type: --  Gram Stain - RRL: --  Gram Stain - Wound: --  Bacteria: --  Culture Results:   >=3 organisms. Probable collection contamination.      Specimen Source:   Method Type:   Gram Stain:   Culture Results: Culture Results:   >=3 organisms. Probable collection contamination. (08-31-24 @ 17:18)    Bacteria: Bacteria: Occasional /HPF (08-31-24 @ 17:13)        -------------------------------------------------------------------------------------------------  RADIOLOGY:      ---------------------------------------------------------------------------------------------------  ASSESSMENT & PLAN:     89-year-old male past medical history CAD status post recent PCI, hypertension, hyperlipidemia, A-fib, CKD stage IIIb who presents with symptomatic bradycardia.      IMPRESSION:    #Symptomatic bradycardia (Afib with Slow VR)   #baseline persistent AF   #CAD s/p [facility] 8/23/24: p-mRCA 80-95% s/p rota and WOLF x2; and planned for staged PCI of LAD / LCx   #severe low flow, low gradient AS  #HTN  #ALESSANDRA on CKD Stage 3b (unclear baseline)      PLAN:    CNS: AO3. CTH negative.    PULMONARY: On room air saturating at 100%. HOB @ 45. Monitor Pulse Ox. Keep > 93%. Supplement as needed.    CARDIOVASCULAR:   -off levo and dopa  -Continue atorvastatin 40 mg  -Continue DAPT (ASA/Plavix)  -Transition heparin gtt to eliquis 2.5mg bid for AF  - c/w coreg 6.25mg q12h  - c/w nifedipine 90mg  - dc hydralazine   - start imdur 30mg daily    GI:   - GI prophylaxis.   - S&S eval: regular diet    RENAL:   - Monitor BMP/Cr  - Avoid nephrotoxic agents   - ALESSANDRA -> cr 2.5. improving  - c/w sodium bicarb 650 TID to target bicarb 22; (now it is 20)    INFECTIOUS DISEASE:   - Monitor WBC  - no signs or symptoms of infection    HEMATOLOGICAL:   - Monitor H&H, Keep active T&S  - eliquis 2.5mg bid for DVT ppx    ENDOCRINE:   - A1C 5.3  - TSH 1.36    MUSCULOSKELETAL:   - PT/OT    Lines: Right IJ central line taken out on 9/2; Left radial A line taken out on 9/3  CODE STATUS: full    DISPOSITION: 4T          FOR FOLLOW UP:  [PT/OT ]   [Started Imdur 30 today ]  [MCOT on dc] Transfer Note    Transfer from: CCU    Transfer to: 4T    Signout given to: Megan Garcia    ----------------------------------------------------------------------------------------------------------    HPI:  89M, Cantonese speaking, with PMHx:  - persistent AF (on Eliquis),   - HTN,   - HLD,   - CKD Stage 3b (unclear baseline),   - thoracic aortic ectasia,   - BPH,   - vitamin D deficiency,   - chronic hepatitis B,   - CAD and severe low flow, low gradient AS:   s/p Left and right Heart cath on 8/23/24: p-mRCA 80-95% s/p rota and WOLF x2; dLM 30%; LAD 80%, mLAD 90%; D1 mod diffuse, D2 mod diffuse; LCx heavily calcified 80% diffuse; OM2 anio FFR 0.72. s/p RHC 8/23/24: RA 13; RV 31/10; PA 38/23 (29); PCWP 19; CO / CI 3.13 / 1.8; AoSat 98/8%; PaSat 68.2%; dopamine 1mcg / kg / min for AV valve studypeak to peak ratio 10.4mmHg; mean 13.75mmHg; MATT 0.91cm; SV 22.63.   - TTE 8/2/24: EF 55-60% mild concentric LVH; no RWMA; LA severely dilated; severe AS; mean 19.7; MATT 0.7; V max 2.95    Patient woke up today very lethargic and weak .  Family called EMS; on their arrival, he was bradycardic to the 30s and with unobtainable blood pressure upon arrival.   Given 1 of atropine and 250 cc bolus with some improvement.    He was transcutaneously paced en route.   on arrival to the emergency department he was bradycardic and hypotensive.   Given that he didn't improve after atropine, he was started on dopamine and levophed; and was given glucagon.     Patient was recently discharged on coreg 6.25 BID and Cardizem 180, however on Wednesday, meds were adjusted by his cardiologist in [facility] Dr Scott to carvedilol 25 mg BID and Nifedipine 90 mg XL OD   Also hydralazine 10 mg and valsartan 40 mg that he was discharged on were switched to losartan 100 mg OD      CCU course:  - 8/30: started on dopamine and levophed. hold carvedilol, nifedipine, hydralazine, losartan, tamsulosin, farxiga. heparin gtt for afib  - 8/31: wean off dopamine and levophed as able. Increased atorva to 40mg  - 9/1: wean off dopamine   - 9/2:             --------------------------------------------------------------------------------------------------------  PMH/PSH:  PAST MEDICAL & SURGICAL HISTORY:  Benign essential HTN    HLD (hyperlipidemia)    Chronic kidney disease, unspecified CKD stage    H/O abdominal aortic aneurysm    Severe aortic stenosis    BPH (benign prostatic hyperplasia)    Chronic hepatitis B    Tricuspid regurgitation    Paroxysmal atrial fibrillation    H/O hemorrhoidectomy        -------------------------------------------------------------------------------------------------------  PHYSICAL EXAM:  General: NAD  HEENT: Atraumatic  Respiratory: CTAB  Cardiac: RRR  Abdomen: soft, non-tender, non-distended  Extremities: warm and well-perfused  Neuro: A+Ox4. No FND    Vital Signs Last 24 Hrs  T(C): 36.9 (03 Sep 2024 12:00), Max: 36.9 (03 Sep 2024 08:00)  T(F): 98.4 (03 Sep 2024 12:00), Max: 98.4 (03 Sep 2024 08:00)  HR: 76 (03 Sep 2024 15:00) (71 - 99)  BP: 160/94 (03 Sep 2024 05:00) (148/98 - 160/94)  BP(mean): 119 (03 Sep 2024 05:00) (113 - 124)  RR: 31 (03 Sep 2024 15:00) (16 - 36)  SpO2: 100% (03 Sep 2024 15:00) (96% - 100%)    Parameters below as of 03 Sep 2024 15:00  Patient On (Oxygen Delivery Method): room air        I&O's Summary    02 Sep 2024 07:01  -  03 Sep 2024 07:00  --------------------------------------------------------  IN: 491 mL / OUT: 2550 mL / NET: -2059 mL    03 Sep 2024 07:01  -  03 Sep 2024 15:45  --------------------------------------------------------  IN: 42 mL / OUT: 500 mL / NET: -458 mL        --------------------------------------------------------------------------------------------------------  LABS:                               12.8   7.15  )-----------( 206      ( 03 Sep 2024 04:40 )             37.5       09-03    138  |  108  |  32<H>  ----------------------------<  94  4.1   |  20  |  2.5<H>    Ca    8.4      03 Sep 2024 04:40  Phos  2.9     09-03  Mg     2.5     09-03    TPro  5.9<L>  /  Alb  3.5  /  TBili  0.7  /  DBili  x   /  AST  28  /  ALT  34  /  AlkPhos  87  09-03      PTT - ( 03 Sep 2024 04:40 )  PTT:90.3 sec        CULTURE RESULTS:                08-31-24 @ 17:18  Specimen Source: --  Method Type: --  Gram Stain - RRL: --  Gram Stain - Wound: --  Bacteria: --  Culture Results:   >=3 organisms. Probable collection contamination.      Specimen Source:   Method Type:   Gram Stain:   Culture Results: Culture Results:   >=3 organisms. Probable collection contamination. (08-31-24 @ 17:18)    Bacteria: Bacteria: Occasional /HPF (08-31-24 @ 17:13)        -------------------------------------------------------------------------------------------------  RADIOLOGY:      ---------------------------------------------------------------------------------------------------  ASSESSMENT & PLAN:     89-year-old male past medical history CAD status post recent PCI, hypertension, hyperlipidemia, A-fib, CKD stage IIIb who presents with symptomatic bradycardia.      IMPRESSION:    #Symptomatic bradycardia (Afib with Slow VR)   #baseline persistent AF   #CAD s/p [facility] 8/23/24: p-mRCA 80-95% s/p rota and WOLF x2; and planned for staged PCI of LAD / LCx   #severe low flow, low gradient AS  #HTN  #ALESSANDRA on CKD Stage 3b (unclear baseline)      PLAN:    CNS: AO3. CTH negative.    PULMONARY: On room air saturating at 100%. HOB @ 45. Monitor Pulse Ox. Keep > 93%. Supplement as needed.    CARDIOVASCULAR:   -off levo and dopa  -Continue atorvastatin 40 mg  -Continue DAPT (ASA/Plavix)  -Transition heparin gtt to eliquis 2.5mg bid for AF  - c/w coreg 6.25mg q12h  - c/w nifedipine 90mg  - dc hydralazine   - start imdur 30mg daily    GI:   - GI prophylaxis.   - S&S eval: regular diet    RENAL:   - Monitor BMP/Cr  - Avoid nephrotoxic agents   - ALESSANDRA -> cr 2.5. improving  - c/w sodium bicarb 650 TID to target bicarb 22; (now it is 20)    INFECTIOUS DISEASE:   - Monitor WBC  - no signs or symptoms of infection    HEMATOLOGICAL:   - Monitor H&H, Keep active T&S  - eliquis 2.5mg bid for DVT ppx    ENDOCRINE:   - A1C 5.3  - TSH 1.36    MUSCULOSKELETAL:   - PT/OT    Lines: Right IJ central line taken out on 9/2; Left radial A line taken out on 9/3  CODE STATUS: full    DISPOSITION: 4T          FOR FOLLOW UP:  [PT/OT ]   [Started Imdur 30 today ]  [MCOT on dc] Transfer Note    Transfer from: CCU    Transfer to: 4T    Signout given to: Megan Garcia    ----------------------------------------------------------------------------------------------------------    HPI:  89M, Cantonese speaking, with PMHx:  - persistent AF (on Eliquis),   - HTN,   - HLD,   - CKD Stage 3b (unclear baseline),   - thoracic aortic ectasia,   - BPH,   - vitamin D deficiency,   - chronic hepatitis B,   - CAD and severe low flow, low gradient AS:   s/p Left and right Heart cath on 8/23/24: p-mRCA 80-95% s/p rota and WOLF x2; dLM 30%; LAD 80%, mLAD 90%; D1 mod diffuse, D2 mod diffuse; LCx heavily calcified 80% diffuse; OM2 anio FFR 0.72. s/p RHC 8/23/24: RA 13; RV 31/10; PA 38/23 (29); PCWP 19; CO / CI 3.13 / 1.8; AoSat 98/8%; PaSat 68.2%; dopamine 1mcg / kg / min for AV valve studypeak to peak ratio 10.4mmHg; mean 13.75mmHg; MATT 0.91cm; SV 22.63.   - TTE 8/2/24: EF 55-60% mild concentric LVH; no RWMA; LA severely dilated; severe AS; mean 19.7; MATT 0.7; V max 2.95    Patient woke up today very lethargic and weak .  Family called EMS; on their arrival, he was bradycardic to the 30s and with unobtainable blood pressure upon arrival.   Given 1 of atropine and 250 cc bolus with some improvement.    He was transcutaneously paced en route.   on arrival to the emergency department he was bradycardic and hypotensive.   Given that he didn't improve after atropine, he was started on dopamine and levophed; and was given glucagon.     Patient was recently discharged on coreg 6.25 BID and Cardizem 180, however on Wednesday, meds were adjusted by his cardiologist in [facility] Dr Scott to carvedilol 25 mg BID and Nifedipine 90 mg XL OD   Also hydralazine 10 mg and valsartan 40 mg that he was discharged on were switched to losartan 100 mg OD      CCU course:  - 8/30: started on dopamine and levophed. hold carvedilol, nifedipine, hydralazine, losartan, tamsulosin, farxiga. heparin gtt for afib  - 8/31: wean off dopamine and levophed as able. Increased atorva to 40mg  - 9/1: wean off dopamine. started hydralazine and nifedipine   - 9/2: restarted coreg 6.25mg   - 9/3 dc hydralazine. started imdur 30mg      --------------------------------------------------------------------------------------------------------  PMH/PSH:  PAST MEDICAL & SURGICAL HISTORY:  Benign essential HTN    HLD (hyperlipidemia)    Chronic kidney disease, unspecified CKD stage    H/O abdominal aortic aneurysm    Severe aortic stenosis    BPH (benign prostatic hyperplasia)    Chronic hepatitis B    Tricuspid regurgitation    Paroxysmal atrial fibrillation    H/O hemorrhoidectomy        -------------------------------------------------------------------------------------------------------  PHYSICAL EXAM:  General: NAD  HEENT: Atraumatic  Respiratory: CTAB  Cardiac: RRR  Abdomen: soft, non-tender, non-distended  Extremities: warm and well-perfused  Neuro: A+Ox4. No FND    Vital Signs Last 24 Hrs  T(C): 36.9 (03 Sep 2024 12:00), Max: 36.9 (03 Sep 2024 08:00)  T(F): 98.4 (03 Sep 2024 12:00), Max: 98.4 (03 Sep 2024 08:00)  HR: 76 (03 Sep 2024 15:00) (71 - 99)  BP: 160/94 (03 Sep 2024 05:00) (148/98 - 160/94)  BP(mean): 119 (03 Sep 2024 05:00) (113 - 124)  RR: 31 (03 Sep 2024 15:00) (16 - 36)  SpO2: 100% (03 Sep 2024 15:00) (96% - 100%)    Parameters below as of 03 Sep 2024 15:00  Patient On (Oxygen Delivery Method): room air        I&O's Summary    02 Sep 2024 07:01  -  03 Sep 2024 07:00  --------------------------------------------------------  IN: 491 mL / OUT: 2550 mL / NET: -2059 mL    03 Sep 2024 07:01  -  03 Sep 2024 15:45  --------------------------------------------------------  IN: 42 mL / OUT: 500 mL / NET: -458 mL        --------------------------------------------------------------------------------------------------------  LABS:                               12.8   7.15  )-----------( 206      ( 03 Sep 2024 04:40 )             37.5       09-03    138  |  108  |  32<H>  ----------------------------<  94  4.1   |  20  |  2.5<H>    Ca    8.4      03 Sep 2024 04:40  Phos  2.9     09-03  Mg     2.5     09-03    TPro  5.9<L>  /  Alb  3.5  /  TBili  0.7  /  DBili  x   /  AST  28  /  ALT  34  /  AlkPhos  87  09-03      PTT - ( 03 Sep 2024 04:40 )  PTT:90.3 sec        CULTURE RESULTS:                08-31-24 @ 17:18  Specimen Source: --  Method Type: --  Gram Stain - RRL: --  Gram Stain - Wound: --  Bacteria: --  Culture Results:   >=3 organisms. Probable collection contamination.      Specimen Source:   Method Type:   Gram Stain:   Culture Results: Culture Results:   >=3 organisms. Probable collection contamination. (08-31-24 @ 17:18)    Bacteria: Bacteria: Occasional /HPF (08-31-24 @ 17:13)        -------------------------------------------------------------------------------------------------  RADIOLOGY:      ---------------------------------------------------------------------------------------------------  ASSESSMENT & PLAN:     89-year-old male past medical history CAD status post recent PCI, hypertension, hyperlipidemia, A-fib, CKD stage IIIb who presents with symptomatic bradycardia.      IMPRESSION:    #Symptomatic bradycardia (Afib with Slow VR)   #baseline persistent AF   #CAD s/p [facility] 8/23/24: p-mRCA 80-95% s/p rota and WOLF x2; and planned for staged PCI of LAD / LCx   #severe low flow, low gradient AS  #HTN  #ALESSANDRA on CKD Stage 3b (unclear baseline)      PLAN:    CNS: AO3. CTH negative.    PULMONARY: On room air saturating at 100%. HOB @ 45. Monitor Pulse Ox. Keep > 93%. Supplement as needed.    CARDIOVASCULAR:   -off levo and dopa  -Continue atorvastatin 40 mg  -Continue DAPT (ASA/Plavix)  -Transition heparin gtt to eliquis 2.5mg bid for AF  - c/w coreg 6.25mg q12h  - c/w nifedipine 90mg  - dc hydralazine   - start imdur 30mg daily    GI:   - GI prophylaxis.   - S&S eval: regular diet    RENAL:   - Monitor BMP/Cr  - Avoid nephrotoxic agents   - ALESSANDRA -> cr 2.5. improving  - c/w sodium bicarb 650 TID to target bicarb 22; (now it is 20)    INFECTIOUS DISEASE:   - Monitor WBC  - no signs or symptoms of infection    HEMATOLOGICAL:   - Monitor H&H, Keep active T&S  - eliquis 2.5mg bid for DVT ppx    ENDOCRINE:   - A1C 5.3  - TSH 1.36    MUSCULOSKELETAL:   - PT/OT    Lines: Right IJ central line taken out on 9/2; Left radial A line taken out on 9/3  CODE STATUS: full    DISPOSITION: 4T          FOR FOLLOW UP:  [PT/OT ]   [Started Imdur 30 today ]  [MCOT on dc] Transfer Note    Transfer from: CCU    Transfer to: 4T    Signout given to: Megan Garcia/ Kristan Hayes    ----------------------------------------------------------------------------------------------------------    HPI:  89M, Cantonese speaking, with PMHx:  - persistent AF (on Eliquis),   - HTN,   - HLD,   - CKD Stage 3b (unclear baseline),   - thoracic aortic ectasia,   - BPH,   - vitamin D deficiency,   - chronic hepatitis B,   - CAD and severe low flow, low gradient AS:   s/p Left and right Heart cath on 8/23/24: p-mRCA 80-95% s/p rota and WOLF x2; dLM 30%; LAD 80%, mLAD 90%; D1 mod diffuse, D2 mod diffuse; LCx heavily calcified 80% diffuse; OM2 anio FFR 0.72. s/p RHC 8/23/24: RA 13; RV 31/10; PA 38/23 (29); PCWP 19; CO / CI 3.13 / 1.8; AoSat 98/8%; PaSat 68.2%; dopamine 1mcg / kg / min for AV valve studypeak to peak ratio 10.4mmHg; mean 13.75mmHg; MATT 0.91cm; SV 22.63.   - TTE 8/2/24: EF 55-60% mild concentric LVH; no RWMA; LA severely dilated; severe AS; mean 19.7; MATT 0.7; V max 2.95    Patient woke up today very lethargic and weak .  Family called EMS; on their arrival, he was bradycardic to the 30s and with unobtainable blood pressure upon arrival.   Given 1 of atropine and 250 cc bolus with some improvement.    He was transcutaneously paced en route.   on arrival to the emergency department he was bradycardic and hypotensive.   Given that he didn't improve after atropine, he was started on dopamine and levophed; and was given glucagon.     Patient was recently discharged on coreg 6.25 BID and Cardizem 180, however on Wednesday, meds were adjusted by his cardiologist in [facility] Dr Scott to carvedilol 25 mg BID and Nifedipine 90 mg XL OD   Also hydralazine 10 mg and valsartan 40 mg that he was discharged on were switched to losartan 100 mg OD      CCU course:  - 8/30: started on dopamine and levophed. hold carvedilol, nifedipine, hydralazine, losartan, tamsulosin, farxiga. heparin gtt for afib  - 8/31: wean off dopamine and levophed as able. Increased atorva to 40mg  - 9/1: wean off dopamine. started hydralazine and nifedipine   - 9/2: restarted coreg 6.25mg   - 9/3 dc hydralazine. started imdur 30mg      --------------------------------------------------------------------------------------------------------  PMH/PSH:  PAST MEDICAL & SURGICAL HISTORY:  Benign essential HTN    HLD (hyperlipidemia)    Chronic kidney disease, unspecified CKD stage    H/O abdominal aortic aneurysm    Severe aortic stenosis    BPH (benign prostatic hyperplasia)    Chronic hepatitis B    Tricuspid regurgitation    Paroxysmal atrial fibrillation    H/O hemorrhoidectomy        -------------------------------------------------------------------------------------------------------  PHYSICAL EXAM:  General: NAD  HEENT: Atraumatic  Respiratory: CTAB  Cardiac: RRR  Abdomen: soft, non-tender, non-distended  Extremities: warm and well-perfused  Neuro: A+Ox4. No FND    Vital Signs Last 24 Hrs  T(C): 36.9 (03 Sep 2024 12:00), Max: 36.9 (03 Sep 2024 08:00)  T(F): 98.4 (03 Sep 2024 12:00), Max: 98.4 (03 Sep 2024 08:00)  HR: 76 (03 Sep 2024 15:00) (71 - 99)  BP: 160/94 (03 Sep 2024 05:00) (148/98 - 160/94)  BP(mean): 119 (03 Sep 2024 05:00) (113 - 124)  RR: 31 (03 Sep 2024 15:00) (16 - 36)  SpO2: 100% (03 Sep 2024 15:00) (96% - 100%)    Parameters below as of 03 Sep 2024 15:00  Patient On (Oxygen Delivery Method): room air        I&O's Summary    02 Sep 2024 07:01  -  03 Sep 2024 07:00  --------------------------------------------------------  IN: 491 mL / OUT: 2550 mL / NET: -2059 mL    03 Sep 2024 07:01  -  03 Sep 2024 15:45  --------------------------------------------------------  IN: 42 mL / OUT: 500 mL / NET: -458 mL        --------------------------------------------------------------------------------------------------------  LABS:                               12.8   7.15  )-----------( 206      ( 03 Sep 2024 04:40 )             37.5       09-03    138  |  108  |  32<H>  ----------------------------<  94  4.1   |  20  |  2.5<H>    Ca    8.4      03 Sep 2024 04:40  Phos  2.9     09-03  Mg     2.5     09-03    TPro  5.9<L>  /  Alb  3.5  /  TBili  0.7  /  DBili  x   /  AST  28  /  ALT  34  /  AlkPhos  87  09-03      PTT - ( 03 Sep 2024 04:40 )  PTT:90.3 sec        CULTURE RESULTS:                08-31-24 @ 17:18  Specimen Source: --  Method Type: --  Gram Stain - RRL: --  Gram Stain - Wound: --  Bacteria: --  Culture Results:   >=3 organisms. Probable collection contamination.      Specimen Source:   Method Type:   Gram Stain:   Culture Results: Culture Results:   >=3 organisms. Probable collection contamination. (08-31-24 @ 17:18)    Bacteria: Bacteria: Occasional /HPF (08-31-24 @ 17:13)        -------------------------------------------------------------------------------------------------  RADIOLOGY:      ---------------------------------------------------------------------------------------------------  ASSESSMENT & PLAN:     89-year-old male past medical history CAD status post recent PCI, hypertension, hyperlipidemia, A-fib, CKD stage IIIb who presents with symptomatic bradycardia.      IMPRESSION:    #Symptomatic bradycardia (Afib with Slow VR)   #baseline persistent AF   #CAD s/p [facility] 8/23/24: p-mRCA 80-95% s/p rota and WOLF x2; and planned for staged PCI of LAD / LCx   #severe low flow, low gradient AS  #HTN  #ALESSANDRA on CKD Stage 3b (unclear baseline)      PLAN:    CNS: AO3. CTH negative.    PULMONARY: On room air saturating at 100%. HOB @ 45. Monitor Pulse Ox. Keep > 93%. Supplement as needed.    CARDIOVASCULAR:   -off levo and dopa  -Continue atorvastatin 40 mg  -Continue DAPT (ASA/Plavix)  -Transition heparin gtt to eliquis 2.5mg bid for AF  - c/w coreg 6.25mg q12h  - c/w nifedipine 90mg  - dc hydralazine   - start imdur 30mg daily    GI:   - GI prophylaxis.   - S&S eval: regular diet    RENAL:   - Monitor BMP/Cr  - Avoid nephrotoxic agents   - ALESSANDRA -> cr 2.5. improving  - c/w sodium bicarb 650 TID to target bicarb 22; (now it is 20)    INFECTIOUS DISEASE:   - Monitor WBC  - no signs or symptoms of infection    HEMATOLOGICAL:   - Monitor H&H, Keep active T&S  - eliquis 2.5mg bid for DVT ppx    ENDOCRINE:   - A1C 5.3  - TSH 1.36    MUSCULOSKELETAL:   - PT/OT    Lines: Right IJ central line taken out on 9/2; Left radial A line taken out on 9/3  CODE STATUS: full    DISPOSITION: 4T          FOR FOLLOW UP:  [PT/OT ]   [Started Imdur 30 today ]  [MCOT on dc]

## 2024-09-03 NOTE — PROGRESS NOTE ADULT - ASSESSMENT
89-year-old male past medical history CAD status post recent PCI, hypertension, hyperlipidemia, A-fib, CKD stage IIIb who presents with symptomatic bradycardia.   89-year-old male past medical history CAD status post recent PCI, hypertension, hyperlipidemia, A-fib, CKD stage IIIb who presents with symptomatic bradycardia.      IMPRESSION:    #Symptomatic bradycardia (Afib with Slow VR)   #baseline persistent AF   #CAD s/p [facility] 8/23/24: p-mRCA 80-95% s/p rota and WOLF x2; and planned for staged PCI of LAD / LCx   #severe low flow, low gradient AS  #HTN  #ALESSANDRA on CKD Stage 3b (unclear baseline)      PLAN:    CNS: AO3. CTH negative.    PULMONARY: On room air saturating at 100%. HOB @ 45. Monitor Pulse Ox. Keep > 93%. Supplement as needed.    CARDIOVASCULAR:   -off levo and dopa  -Continue atorvastatin 40 mg  -Continue DAPT (ASA/Plavix)  -Transition heparin gtt to eliquis 2.5mg bid for AF  - c/w coreg 6.25mg q12h  - c/w nifedipine 90mg  - dc hydralazine   - start imdur 30mg daily    GI:   - GI prophylaxis.   - S&S eval: regular diet    RENAL:   - Monitor BMP/Cr  - Avoid nephrotoxic agents   - ALESSANDRA -> cr 2.5. improving  - c/w sodium bicarb 650 TID to target bicarb 22; (now it is 20)    INFECTIOUS DISEASE:   - Monitor WBC  - no signs or symptoms of infection    HEMATOLOGICAL:   - Monitor H&H, Keep active T&S  - eliquis 2.5mg bid for DVT ppx    ENDOCRINE:   - A1C 5.3  - TSH 1.36    MUSCULOSKELETAL:   - PT/OT    Lines: Right IJ central line taken out on 9/2; Left radial A line taken out on 9/3  CODE STATUS: full    DISPOSITION: 4T     89-year-old male past medical history CAD status post recent PCI, hypertension, hyperlipidemia, A-fib, CKD stage IIIb who presents with symptomatic bradycardia.      IMPRESSION:    #Symptomatic bradycardia (Afib with Slow VR)   #baseline persistent AF   #CAD s/p [facility] 8/23/24: p-mRCA 80-95% s/p rota and WOLF x2; and planned for staged PCI of LAD / LCx   #severe low flow, low gradient AS  #HTN  #ALESSANDRA on CKD Stage 3b (unclear baseline)      PLAN:    CNS: AO3. CTH negative.    PULMONARY: On room air saturating at 100%. HOB @ 45. Monitor Pulse Ox. Keep > 93%. Supplement as needed.    CARDIOVASCULAR:   -off levo and dopa  -Continue atorvastatin 40 mg  -Continue DAPT (ASA/Plavix)  -Transition heparin gtt to eliquis 2.5mg bid for AF  - c/w coreg 6.25mg q12h  - c/w nifedipine 90mg  - dc hydralazine   - start imdur 30mg daily  - EP: MCOT on dc    GI:   - GI prophylaxis.   - S&S eval: regular diet    RENAL:   - Monitor BMP/Cr  - Avoid nephrotoxic agents   - ALESSANDRA -> cr 2.5. improving  - c/w sodium bicarb 650 TID to target bicarb 22; (now it is 20)    INFECTIOUS DISEASE:   - Monitor WBC  - no signs or symptoms of infection    HEMATOLOGICAL:   - Monitor H&H, Keep active T&S  - eliquis 2.5mg bid for DVT ppx    ENDOCRINE:   - A1C 5.3  - TSH 1.36    MUSCULOSKELETAL:   - PT/OT    Lines: Right IJ central line taken out on 9/2; Left radial A line taken out on 9/3  CODE STATUS: full    DISPOSITION: 4T

## 2024-09-03 NOTE — PROGRESS NOTE ADULT - SUBJECTIVE AND OBJECTIVE BOX
89-year-old male past medical history CAD status post recent PCI, hypertension, hyperlipidemia, A-fib, CKD stage IIIb who presents with symptomatic bradycardia.    INTERVAL HPI/OVERNIGHT EVENTS:   No overnight events   Afebrile, hemodynamically stable     Subjective:    ICU Vital Signs Last 24 Hrs  T(C): 36.2 (03 Sep 2024 04:00), Max: 36.6 (02 Sep 2024 20:00)  T(F): 97.2 (03 Sep 2024 04:00), Max: 97.9 (02 Sep 2024 20:00)  HR: 84 (03 Sep 2024 07:00) (66 - 97)  BP: 160/94 (03 Sep 2024 05:00) (148/98 - 160/94)  BP(mean): 119 (03 Sep 2024 05:00) (113 - 124)  ABP: 160/78 (03 Sep 2024 07:00) (114/63 - 173/87)  ABP(mean): 108 (03 Sep 2024 07:00) (80 - 119)  RR: 18 (03 Sep 2024 07:00) (16 - 42)  SpO2: 100% (03 Sep 2024 07:00) (95% - 100%)    O2 Parameters below as of 03 Sep 2024 07:00  Patient On (Oxygen Delivery Method): room air          I&O's Summary    02 Sep 2024 07:01  -  03 Sep 2024 07:00  --------------------------------------------------------  IN: 491 mL / OUT: 2550 mL / NET: -2059 mL          Daily     Daily Weight in k.9 (03 Sep 2024 06:00)    Adult Advanced Hemodynamics Last 24 Hrs  CVP(mm Hg): --  CVP(cm H2O): --  CO: --  CI: --  PA: --  PA(mean): --  PCWP: --  SVR: --  SVRI: --  PVR: --  PVRI: --    EKG/Telemetry Events:    MEDICATIONS  (STANDING):  aspirin enteric coated 81 milliGRAM(s) Oral daily  atorvastatin 40 milliGRAM(s) Oral at bedtime  carvedilol 6.25 milliGRAM(s) Oral every 12 hours  chlorhexidine 2% Cloths 1 Application(s) Topical daily  clopidogrel Tablet 75 milliGRAM(s) Oral daily  finasteride 5 milliGRAM(s) Oral daily  heparin  Infusion. 900 Unit(s)/Hr (9 mL/Hr) IV Continuous <Continuous>  hydrALAZINE 50 milliGRAM(s) Oral three times a day  NIFEdipine XL 90 milliGRAM(s) Oral daily  pantoprazole    Tablet 40 milliGRAM(s) Oral before breakfast  sodium bicarbonate 650 milliGRAM(s) Oral every 8 hours    MEDICATIONS  (PRN):  heparin   Injectable 3000 Unit(s) IV Push every 6 hours PRN For aPTT between 40 - 57  heparin   Injectable 6000 Unit(s) IV Push every 6 hours PRN For aPTT less than 40      PHYSICAL EXAM:  GENERAL:   HEAD:  Atraumatic, Normocephalic  EYES: EOMI, PERRLA, conjunctiva and sclera clear  NECK: Supple, No JVD, Normal thyroid, no enlarged nodes  NERVOUS SYSTEM:  Alert & Awake.   CHEST/LUNG: B/L good air entry; No rales, rhonchi, or wheezing  HEART: S1S2 normal, no S3, Regular rate and rhythm; No murmurs  ABDOMEN: Soft, Nontender, Nondistended; Bowel sounds present  EXTREMITIES:  2+ Peripheral Pulses, No clubbing, cyanosis, or edema  LYMPH: No lymphadenopathy noted  SKIN: No rashes or lesions    LABS:                        12.8   7.15  )-----------( 206      ( 03 Sep 2024 04:40 )             37.5     09-03    138  |  108  |  32<H>  ----------------------------<  94  4.1   |  20  |  2.5<H>    Ca    8.4      03 Sep 2024 04:40  Phos  2.9     09-  Mg     2.5     -    TPro  5.9<L>  /  Alb  3.5  /  TBili  0.7  /  DBili  x   /  AST  28  /  ALT  34  /  AlkPhos  87  09-03    LIVER FUNCTIONS - ( 03 Sep 2024 04:40 )  Alb: 3.5 g/dL / Pro: 5.9 g/dL / ALK PHOS: 87 U/L / ALT: 34 U/L / AST: 28 U/L / GGT: x           PTT - ( 03 Sep 2024 04:40 )  PTT:90.3 sec  CAPILLARY BLOOD GLUCOSE                Urinalysis Basic - ( 03 Sep 2024 04:40 )    Color: x / Appearance: x / SG: x / pH: x  Gluc: 94 mg/dL / Ketone: x  / Bili: x / Urobili: x   Blood: x / Protein: x / Nitrite: x   Leuk Esterase: x / RBC: x / WBC x   Sq Epi: x / Non Sq Epi: x / Bacteria: x          RADIOLOGY & ADDITIONAL TESTS:  CXR:        Care Discussed with Consultants/Other Providers [ x] YES  [ ] NO

## 2024-09-04 ENCOUNTER — TRANSCRIPTION ENCOUNTER (OUTPATIENT)
Age: 89
End: 2024-09-04

## 2024-09-04 VITALS
TEMPERATURE: 97 F | HEART RATE: 73 BPM | OXYGEN SATURATION: 97 % | DIASTOLIC BLOOD PRESSURE: 76 MMHG | SYSTOLIC BLOOD PRESSURE: 115 MMHG | RESPIRATION RATE: 20 BRPM

## 2024-09-04 LAB
ANION GAP SERPL CALC-SCNC: 11 MMOL/L — SIGNIFICANT CHANGE UP (ref 7–14)
BUN SERPL-MCNC: 31 MG/DL — HIGH (ref 10–20)
CALCIUM SERPL-MCNC: 8.4 MG/DL — SIGNIFICANT CHANGE UP (ref 8.4–10.5)
CHLORIDE SERPL-SCNC: 105 MMOL/L — SIGNIFICANT CHANGE UP (ref 98–110)
CO2 SERPL-SCNC: 22 MMOL/L — SIGNIFICANT CHANGE UP (ref 17–32)
CREAT SERPL-MCNC: 2.2 MG/DL — HIGH (ref 0.7–1.5)
EGFR: 28 ML/MIN/1.73M2 — LOW
GLUCOSE SERPL-MCNC: 131 MG/DL — HIGH (ref 70–99)
HCT VFR BLD CALC: 35.9 % — LOW (ref 42–52)
HGB BLD-MCNC: 11.6 G/DL — LOW (ref 14–18)
MAGNESIUM SERPL-MCNC: 2.3 MG/DL — SIGNIFICANT CHANGE UP (ref 1.8–2.4)
MCHC RBC-ENTMCNC: 31.1 PG — HIGH (ref 27–31)
MCHC RBC-ENTMCNC: 32.3 G/DL — SIGNIFICANT CHANGE UP (ref 32–37)
MCV RBC AUTO: 96.2 FL — HIGH (ref 80–94)
NRBC # BLD: 0 /100 WBCS — SIGNIFICANT CHANGE UP (ref 0–0)
PLATELET # BLD AUTO: 198 K/UL — SIGNIFICANT CHANGE UP (ref 130–400)
PMV BLD: 11.2 FL — HIGH (ref 7.4–10.4)
POTASSIUM SERPL-MCNC: 4 MMOL/L — SIGNIFICANT CHANGE UP (ref 3.5–5)
POTASSIUM SERPL-SCNC: 4 MMOL/L — SIGNIFICANT CHANGE UP (ref 3.5–5)
RBC # BLD: 3.73 M/UL — LOW (ref 4.7–6.1)
RBC # FLD: 13.5 % — SIGNIFICANT CHANGE UP (ref 11.5–14.5)
SODIUM SERPL-SCNC: 138 MMOL/L — SIGNIFICANT CHANGE UP (ref 135–146)
WBC # BLD: 6.73 K/UL — SIGNIFICANT CHANGE UP (ref 4.8–10.8)
WBC # FLD AUTO: 6.73 K/UL — SIGNIFICANT CHANGE UP (ref 4.8–10.8)

## 2024-09-04 PROCEDURE — 99239 HOSP IP/OBS DSCHRG MGMT >30: CPT

## 2024-09-04 RX ORDER — CARVEDILOL 6.25 MG/1
1 TABLET ORAL
Qty: 60 | Refills: 0
Start: 2024-09-04 | End: 2024-10-03

## 2024-09-04 RX ORDER — CARVEDILOL 6.25 MG/1
1 TABLET ORAL
Refills: 0 | DISCHARGE

## 2024-09-04 RX ORDER — LOSARTAN POTASSIUM 50 MG/1
1 TABLET ORAL
Refills: 0 | DISCHARGE

## 2024-09-04 RX ORDER — NIFEDIPINE 60 MG/1
1 TABLET, FILM COATED, EXTENDED RELEASE ORAL
Qty: 30 | Refills: 0
Start: 2024-09-04 | End: 2024-10-03

## 2024-09-04 RX ORDER — NIFEDIPINE 60 MG/1
1 TABLET, FILM COATED, EXTENDED RELEASE ORAL
Refills: 0 | DISCHARGE

## 2024-09-04 RX ORDER — ISOSORBIDE MONONITRATE 30 MG/1
1 TABLET, EXTENDED RELEASE ORAL
Qty: 30 | Refills: 0
Start: 2024-09-04 | End: 2024-10-03

## 2024-09-04 RX ADMIN — APIXABAN 2.5 MILLIGRAM(S): 5 TABLET, FILM COATED ORAL at 05:28

## 2024-09-04 RX ADMIN — ISOSORBIDE MONONITRATE 30 MILLIGRAM(S): 30 TABLET, EXTENDED RELEASE ORAL at 13:30

## 2024-09-04 RX ADMIN — APIXABAN 2.5 MILLIGRAM(S): 5 TABLET, FILM COATED ORAL at 15:07

## 2024-09-04 RX ADMIN — SODIUM BICARBONATE 650 MILLIGRAM(S): 84 INJECTION, SOLUTION INTRAVENOUS at 05:29

## 2024-09-04 RX ADMIN — Medication 75 MILLIGRAM(S): at 12:49

## 2024-09-04 RX ADMIN — CHLORHEXIDINE GLUCONATE 1 APPLICATION(S): 40 SOLUTION TOPICAL at 12:55

## 2024-09-04 RX ADMIN — FINASTERIDE 5 MILLIGRAM(S): 1 TABLET, FILM COATED ORAL at 12:49

## 2024-09-04 RX ADMIN — Medication 81 MILLIGRAM(S): at 12:49

## 2024-09-04 RX ADMIN — SODIUM BICARBONATE 650 MILLIGRAM(S): 84 INJECTION, SOLUTION INTRAVENOUS at 15:07

## 2024-09-04 RX ADMIN — NIFEDIPINE 90 MILLIGRAM(S): 60 TABLET, FILM COATED, EXTENDED RELEASE ORAL at 05:29

## 2024-09-04 RX ADMIN — CARVEDILOL 6.25 MILLIGRAM(S): 6.25 TABLET ORAL at 05:28

## 2024-09-04 RX ADMIN — Medication 40 MILLIGRAM(S): at 05:28

## 2024-09-04 NOTE — CHART NOTE - NSCHARTNOTEFT_GEN_A_CORE
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 This diagnosis has caused the patient to have a mobility limitation that significantly impairs the pt's ability to participate in one or more MRADL's such as toileting, eating, dressing, and bathing in customary locations in the home. The pt's home provides adequate access between rooms for the use of a rolling walker. The RW will significantly improve the pt's ability to participate in MRADL's and will be used on a regular basis in the home. The patient's mobility limitation cannot be resolved by the use of a walker or cane.

## 2024-09-04 NOTE — DISCHARGE NOTE NURSING/CASE MANAGEMENT/SOCIAL WORK - NSDCPEFALRISK_GEN_ALL_CORE
For information on Fall & Injury Prevention, visit: [email or web address] OR  [email or web address] OR  https://www.cdc.gov/steadi/patient.html

## 2024-09-04 NOTE — OCCUPATIONAL THERAPY INITIAL EVALUATION ADULT - NSOTDISCHREC_GEN_A_CORE
Pt requires CGA/supervision with functional transfers and ADLs. will benefit from home with assistance, no OT needs, see PT rec as pt's decreased function primarily mobility related

## 2024-09-04 NOTE — DISCHARGE NOTE PROVIDER - PROVIDER TOKENS
PROVIDER:[TOKEN:[[id number]],FOLLOWUP:[2 weeks]] PROVIDER:[TOKEN:[[id number]],FOLLOWUP:[2 weeks]],PROVIDER:[TOKEN:[[id number]],FOLLOWUP:[1 month]]

## 2024-09-04 NOTE — PHYSICAL THERAPY INITIAL EVALUATION ADULT - PERTINENT HX OF CURRENT PROBLEM, REHAB EVAL
89-year-old male past medical history CAD status post recent PCI, hypertension, hyperlipidemia, A-fib, CKD stage IIIb who presents with symptomatic bradycardia. right foot with bandage D&C soft mass on anterior  left arm appreciated

## 2024-09-04 NOTE — PHYSICAL THERAPY INITIAL EVALUATION ADULT - ADDITIONAL COMMENTS
Pt resides with family in a private house using 3 steps to enter no other stairs to negotiate. Pt used to be independent with overall functioanl mobility, able to ambulate using no AD at baseline, cane of outdoor ambulation.

## 2024-09-04 NOTE — OCCUPATIONAL THERAPY INITIAL EVALUATION ADULT - GENERAL OBSERVATIONS, REHAB EVAL
Pt received semi brower in bed in NAD, family at bedside, + tele, +BP cuff, + pulse oxi, agreeable to OT evaluation, Cantonese speaking, family interprets for pt at pt request, left semi brower in bed at request of pt Rn aware of all

## 2024-09-04 NOTE — DISCHARGE NOTE PROVIDER - HOSPITAL COURSE
Mr. Iglesia Lee is a 88 yo M, Cantonese speaking, with PMHx of persistent AF (on Eliquis), HTN, HLD, CKD Stage 3b (unclear baseline), thoracic aortic ectasia, BPH, vitamin D deficiency, chronic hepatitis B, CAD and severe low flow, low gradient AS: s/p Left and RHC at [facility] [facility] on 8/23. Patient was recently discharged on Coreg 6.25 BID and Cardizem 180mg, however on Wednesday, meds were adjusted by his cardiologist in [facility] Dr. Scott to Carvedilol 25 mg BID and Nifedipine 90 mg XL OD. Also Hydralazine 10 mg and Valsartan 40 mg that he was discharged on were switched to Losartan 100 mg OD. Patient woke up very lethargic and weak . Family called EMS; on their arrival, he was bradycardic to the 30s and with unobtainable blood pressure upon arrival. He was given 1 of atropine and 250 cc bolus with some improvement and was transcutaneously paced en route. Upon arrival to the emergency department he was bradycardic and hypotensive. Given that he didn't improve after atropine, he was started on dopamine and levophed; and was given glucagon. Admitted to CCU. While in CCU his medications were adjusted.   Patient is now on Coreg 6.25mg BID, Nifedipine XL 90mg, Eliquis 2.5mg BID, ASA/Plavix, atorvastatin 40mg HS, Imdur 30mg.     For his bradycardia, EP was consulted, patient was placed on MCOT and will f/u with EP in 1 month for follow up.     Patient is hemodynamically stable, safe to be dc home to f/u with Dr. Scott in 1 week.   Records:  s/p Left and right Heart cath on 8/23/24: p-mRCA 80-95% s/p rota and WOLF x2; dLM 30%; LAD 80%, mLAD 90%; D1 mod diffuse, D2 mod diffuse; LCx heavily calcified 80% diffuse; OM2 anio FFR 0.72. s/p RHC 8/23/24: RA 13; RV 31/10; PA 38/23 (29); PCWP 19; CO / CI 3.13 / 1.8; AoSat 98/8%; PaSat 68.2%; dopamine 1mcg / kg / min for AV valve studypeak to peak ratio 10.4mmHg; mean 13.75mmHg; MATT 0.91cm; SV 22.63.   TTE 8/2/24: EF 55-60% mild concentric LVH; no RWMA; LA severely dilated; severe AS; mean 19.7; MATT 0.7; V max 2.95     Mr. Iglesia Lee is a 88 yo M, Cantonese speaking, with PMHx of persistent AF (on Eliquis), HTN, HLD, CKD Stage 3b (unclear baseline), thoracic aortic ectasia, BPH, vitamin D deficiency, chronic hepatitis B, CAD and severe low flow, low gradient AS: s/p Left and RHC at [facility] on 8/23. Patient was recently discharged on Coreg 6.25 BID and Cardizem 180mg, however on Wednesday, meds were adjusted by his cardiologist in [facility] Dr. Scott to Carvedilol 25 mg BID and Nifedipine 90 mg XL OD. Also Hydralazine 10 mg and Valsartan 40 mg that he was discharged on were switched to Losartan 100 mg OD. Patient woke up very lethargic and weak . Family called EMS; on their arrival, he was bradycardic to the 30s and with unobtainable blood pressure upon arrival. He was given 1 of atropine and 250 cc bolus with some improvement and was transcutaneously paced en route. Upon arrival to the emergency department he was bradycardic and hypotensive. Given that he didn't improve after atropine, he was started on dopamine and levophed; and was given glucagon. Admitted to CCU. While in CCU his medications were adjusted.   Patient is now on Coreg 6.25mg BID, Nifedipine XL 90mg, Eliquis 2.5mg BID, ASA/Plavix, atorvastatin 40mg HS, Imdur 30mg.     For his bradycardia, EP was consulted, patient was placed on MCOT and will f/u with EP in 1 month for follow up.     Patient is hemodynamically stable, safe to be dc home to f/u with Dr. Scott in 1 week.   Records:  s/p Left and right Heart cath on 8/23/24: p-mRCA 80-95% s/p rota and WOLF x2; dLM 30%; LAD 80%, mLAD 90%; D1 mod diffuse, D2 mod diffuse; LCx heavily calcified 80% diffuse; OM2 anio FFR 0.72. s/p RHC 8/23/24: RA 13; RV 31/10; PA 38/23 (29); PCWP 19; CO / CI 3.13 / 1.8; AoSat 98/8%; PaSat 68.2%; dopamine 1mcg / kg / min for AV valve studypeak to peak ratio 10.4mmHg; mean 13.75mmHg; MATT 0.91cm; SV 22.63.    TTE 8/2/24: EF 55-60% mild concentric LVH; no RWMA; LA severely dilated; severe AS; mean 19.7; MATT 0.7; V max 2.95

## 2024-09-04 NOTE — DISCHARGE NOTE PROVIDER - CARE PROVIDER_API CALL
Scot Scott  Cardiovascular Disease  [address] [place] [address], [place] [address]  Phone: [phone]  Fax: [phone]  Follow Up Time: 2 weeks   Scot Scott  Cardiovascular Disease  [address] [place] [address]  Phone: [phone]  Fax: [phone]  Follow Up Time: 2 weeks    Georges Tejada  Cardiac Electrophysiology  [address] [place] [address], [address] [address]  Phone: [phone]  Fax: [phone]  Follow Up Time: 1 month

## 2024-09-04 NOTE — DISCHARGE NOTE PROVIDER - NSDCCPCAREPLAN_GEN_ALL_CORE_FT
PRINCIPAL DISCHARGE DIAGNOSIS  Diagnosis: Atrial fibrillation with slow ventricular response  Assessment and Plan of Treatment:       SECONDARY DISCHARGE DIAGNOSES  Diagnosis: Symptomatic bradycardia  Assessment and Plan of Treatment:

## 2024-09-04 NOTE — DISCHARGE NOTE PROVIDER - NSDCMRMEDTOKEN_GEN_ALL_CORE_FT
aspirin 81 mg oral delayed release tablet: 1 tab(s) orally once a day  atorvastatin 40 mg oral tablet: 1 tab(s) orally once a day (at bedtime)  calcium (as carbonate)-vitamin D 600 mg-5 mcg (200 intl units) oral tablet: 1 tab(s) orally 2 times a day  carvedilol 6.25 mg oral tablet: 1 tab(s) orally every 12 hours  cholecalciferol 1250 mcg (50,000 intl units) oral capsule: 1 cap(s) orally once a week  cholestyramine 4 g/8.3 g oral powder for reconstitution: 4 gram(s) orally once a day  clopidogrel 75 mg oral tablet: 1 tab(s) orally once a day  dutasteride 0.5 mg oral capsule: 1 capsule orally once a day  Eliquis 2.5 mg oral tablet: 1 tab(s) orally 2 times a day  Farxiga 10 mg oral tablet: 1 tab(s) orally once a day  isosorbide mononitrate 30 mg oral tablet, extended release: 1 tab(s) orally once a day  montelukast 10 mg oral tablet: 1 tab(s) orally once a day  Nephro-Braldy  mg-1 mg oral tablet: 1 tab(s) orally once a day  NIFEdipine 90 mg oral tablet, extended release: 1 tab(s) orally once a day  sodium bicarbonate: 650 milligram(s) orally once a day  tamsulosin 0.4 mg oral capsule: 1 tab(s) orally once a day

## 2024-09-04 NOTE — OCCUPATIONAL THERAPY INITIAL EVALUATION ADULT - ADL RETRAINING, OT EVAL
Patient will perform upper body dressing independently by discharge. Patient will perform lower body dressing independently with use of appropriate adaptive equipment as needed by discharge.

## 2024-09-04 NOTE — DISCHARGE NOTE PROVIDER - ATTENDING DISCHARGE PHYSICAL EXAMINATION:
I saw and evaluated the patient the day of discharge.  Was admitted for severe bradycardia in the setting of high dose beta blocker use.  Underwent PCI of the LAD 8/2024 with plans for staged PCI of the LCx later this month.  Has known severe LFLG AS and is following with Dr. Scott.  I spoke with Dr. Scott today and provided updates regarding his care.  Patient is stable for discharge with close cardiology follow up and plans for staged PCI and ongoing evaluation for possible TAVR.

## 2024-09-04 NOTE — PHYSICAL THERAPY INITIAL EVALUATION ADULT - GAIT TRAINING, PT EVAL
Pt will ambulate using RW or least restrictive AD for 100 ft with supervision by discharge to facilitate return to PLOF. Pt will negotiate 3 steps using 1 HR under  supervision

## 2024-09-04 NOTE — DISCHARGE NOTE NURSING/CASE MANAGEMENT/SOCIAL WORK - PATIENT PORTAL LINK FT
You can access the FollowMyHealth Patient Portal offered by [facility] by registering at the following website: [email or web address]. By joining [employer]’s FollowMyHealth portal, you will also be able to view your health information using other applications (apps) compatible with our system.

## 2024-09-04 NOTE — DISCHARGE NOTE PROVIDER - CARE PROVIDERS DIRECT ADDRESSES
,[email or web address]

## 2024-09-04 NOTE — PHYSICAL THERAPY INITIAL EVALUATION ADULT - GENERAL OBSERVATIONS, REHAB EVAL
8:25-8:55. chart reviewed. Pt received semi-brower at B/S, alert, oriented X 3, able to follow one step instructions Pt is Cantonese speaking, Son at /S for translation and agreeable to PT evaluation. + IV, + monitoring, on RA VSS, SPO2 98%,-ve orthostatic, denies pain or discomfort.

## 2024-09-06 NOTE — CDI QUERY NOTE - NSCDIOTHERTXTBX_GEN_ALL_CORE_HH
Clinical documentation and/or evidence of the patient’s presentation, evaluation, and medical management, as evidenced below, may support a diagnosis that is not documented in the medical record.  In order to ensure accurate coding and accuracy of the clinical record, the documentation in this patient’s medical record requires additional clarification.      If you think the supporting documentation and/or clinical evidence supports a more specific diagnosis, please include more specific documentation of a diagnosis associated with these findings in your progress note and/or discharge summary.      Please clarify this patient’s perfusion status:      •	Cardiogenic shock  •	Shock associated with bradycardia   •	Other (specify):        Supporting documentation and/or clinical evidence:     8/30 ED Provider Note: pt is bradycardic to high 30s low 40s hypotensive to 80s/50 being paced transcutaneously by ems, one dose of atropine given by ems without response. pt placed in trauma bay, 2 iv established ecg shows a-fib with slow repose (40s) will resuscitate with levo and dopamine considering TVP consulted cardiology fellow. pt is cool and clammy, lungs clear and appear confused.     8/30 H&P Adult: Responding to vasopressors: c/w dopamine and levophed. Wean of pressors gradually. Try to wean off the levophed first.    8/31 PN Adult-CCU Attending: Family called EMS; on their arrival, he was bradycardic to the 30s and with unobtainable blood pressure upon arrival.  Given 1 of atropine and 250 cc bolus with some improvement.  He was transcutaneously paced en route. on arrival to the emergency department he was bradycardic and hypotensive. Given that he didn't improve after atropine, he was started on dopamine and levophed; and was given glucagon.    9/1 PN Adult-CCU Attending: Upon arrival to ED patient was noted to have heart rate in the 30s and hypotensive.  Patient was started on Levophed and dopamine and transferred to CCU.    9/3 DN Provider: Upon arrival to the emergency department he was bradycardic and hypotensive. Given that he didn't improve after atropine, he was started on dopamine and levophed; and was given glucagon. Admitted to CCU. While in CCU his medications were adjusted. … For his bradycardia, EP was consulted, patient was placed on MCOT and will f/u with EP in 1 month for follow up.    Vital Signs:  ED Flowsheet: 8/30 (1150): BP 85/54; (1200) BP 74/51; (1217) BP 79/52; (1321) ABP 83/43    Labs:   8/30: (1341) ABG: pH 7.32, pCO2 33, HCO3 17  8/30: (1915) ABG: pH 7.33, pCO2 31, HCO3 16    Orders:   8/31: Art line  8/31-9/1: Dopamine  8/30-9/1: Levophed  8/30-Active; Sodium bicarb 650mg PO Q8H      Thank you,  Pretty CHAUHAN, RN, BSN  [phone]

## 2024-09-10 DIAGNOSIS — Z95.5 PRESENCE OF CORONARY ANGIOPLASTY IMPLANT AND GRAFT: ICD-10-CM

## 2024-09-10 DIAGNOSIS — Z79.02 LONG TERM (CURRENT) USE OF ANTITHROMBOTICS/ANTIPLATELETS: ICD-10-CM

## 2024-09-10 DIAGNOSIS — I48.19 OTHER PERSISTENT ATRIAL FIBRILLATION: ICD-10-CM

## 2024-09-10 DIAGNOSIS — I12.9 HYPERTENSIVE CHRONIC KIDNEY DISEASE WITH STAGE 1 THROUGH STAGE 4 CHRONIC KIDNEY DISEASE, OR UNSPECIFIED CHRONIC KIDNEY DISEASE: ICD-10-CM

## 2024-09-10 DIAGNOSIS — N17.9 ACUTE KIDNEY FAILURE, UNSPECIFIED: ICD-10-CM

## 2024-09-10 DIAGNOSIS — N18.32 CHRONIC KIDNEY DISEASE, STAGE 3B: ICD-10-CM

## 2024-09-10 DIAGNOSIS — Z79.82 LONG TERM (CURRENT) USE OF ASPIRIN: ICD-10-CM

## 2024-09-10 DIAGNOSIS — R57.8 OTHER SHOCK: ICD-10-CM

## 2024-09-10 DIAGNOSIS — I35.0 NONRHEUMATIC AORTIC (VALVE) STENOSIS: ICD-10-CM

## 2024-09-10 DIAGNOSIS — B18.1 CHRONIC VIRAL HEPATITIS B WITHOUT DELTA-AGENT: ICD-10-CM

## 2024-09-10 DIAGNOSIS — I25.10 ATHEROSCLEROTIC HEART DISEASE OF NATIVE CORONARY ARTERY WITHOUT ANGINA PECTORIS: ICD-10-CM

## 2024-11-02 PROBLEM — Z00.00 ENCOUNTER FOR PREVENTIVE HEALTH EXAMINATION: Status: ACTIVE | Noted: 2024-11-02

## 2025-01-14 ENCOUNTER — APPOINTMENT (OUTPATIENT)
Dept: CARDIOLOGY | Facility: CLINIC | Age: 89
End: 2025-01-14

## 2025-02-21 NOTE — PROCEDURAL SAFETY CHECKLIST WITH OR WITHOUT SEDATION - NSGUIDEWIRESREMOVEDSD_GEN_ALL_CORE
Problem: Respiratory - Adult  Goal: Clear lung sounds  Outcome: Progressing  Note: Patient agrees to goals      done